# Patient Record
Sex: FEMALE | Race: WHITE | URBAN - METROPOLITAN AREA
[De-identification: names, ages, dates, MRNs, and addresses within clinical notes are randomized per-mention and may not be internally consistent; named-entity substitution may affect disease eponyms.]

---

## 2017-04-10 ENCOUNTER — TRANSFERRED RECORDS (OUTPATIENT)
Dept: HEALTH INFORMATION MANAGEMENT | Facility: CLINIC | Age: 22
End: 2017-04-10

## 2017-04-18 ENCOUNTER — TELEPHONE (OUTPATIENT)
Dept: OTHER | Facility: CLINIC | Age: 22
End: 2017-04-18

## 2017-04-18 NOTE — TELEPHONE ENCOUNTER
Referral received via fax.     Pt referred to [facility] by Hugo Chin MD from [facility] for possible popliteal entrapment.    Pt needs to be scheduled for consult with vascular surgery.  Will route to scheduling to coordinate an appointment at next available.    Mabel Soto RN BSN

## 2017-05-11 ENCOUNTER — OFFICE VISIT (OUTPATIENT)
Dept: OTHER | Facility: CLINIC | Age: 22
End: 2017-05-11
Attending: SURGERY
Payer: OTHER GOVERNMENT

## 2017-05-11 VITALS
HEIGHT: 64 IN | DIASTOLIC BLOOD PRESSURE: 67 MMHG | SYSTOLIC BLOOD PRESSURE: 121 MMHG | WEIGHT: 146 LBS | HEART RATE: 77 BPM | BODY MASS INDEX: 24.92 KG/M2

## 2017-05-11 DIAGNOSIS — I77.89 POPLITEAL ARTERY ENTRAPMENT SYNDROME (H): Primary | ICD-10-CM

## 2017-05-11 PROCEDURE — 99211 OFF/OP EST MAY X REQ PHY/QHP: CPT

## 2017-05-11 PROCEDURE — 99203 OFFICE O/P NEW LOW 30 MIN: CPT | Mod: ZP | Performed by: SURGERY

## 2017-05-11 NOTE — MR AVS SNAPSHOT
"              After Visit Summary   2017    Altagracia Wright    MRN: [id number]           Patient Information     Date Of Birth          1995        Visit Information        Provider Department      2017 2:00 PM Herbierto Osborn MD [facility] at  [facility] [place]      Today's Diagnoses     Popliteal artery entrapment syndrome (H)    -  1       Follow-ups after your visit        Who to contact     If you have questions or need follow up information about today's clinic visit or your schedule please contact [facility] directly at [phone].  Normal or non-critical lab and imaging results will be communicated to you by [employer], letter or phone within 4 business days after the clinic has received the results. If you do not hear from us within 7 days, please contact the clinic through [employer] or phone. If you have a critical or abnormal lab result, we will notify you by phone as soon as possible.  Submit refill requests through [employer] or call your pharmacy and they will forward the refill request to us. Please allow 3 business days for your refill to be completed.          Additional Information About Your Visit        MyChart Information     [employer] lets you send messages to your doctor, view your test results, renew your prescriptions, schedule appointments and more. To sign up, go to [email or web address] . Click on \"Log in\" on the left side of the screen, which will take you to the Welcome page. Then click on \"Sign up Now\" on the right side of the page.     You will be asked to enter the access code listed below, as well as some personal information. Please follow the directions to create your username and password.     Your access code is: [email or web address]  Expires: 2017  7:01 PM     Your access code will  in 90 days. If you need help or a new code, please call your [facility] clinic or [phone].        Care " "EveryWhere ID     This is your Care EveryWhere ID. This could be used by other organizations to access your [facility] medical records  [id number]        Your Vitals Were     Pulse Height BMI (Body Mass Index)             77 5' 4\" (1.626 m) 25.06 kg/m2          Blood Pressure from Last 3 Encounters:   05/11/17 121/67    Weight from Last 3 Encounters:   05/11/17 146 lb (66.2 kg)              Today, you had the following     No orders found for display       Primary Care Provider Office Phone # Fax #    Heriberto Zavala [phone]       [facility] [address]  [facility] [address]        Thank you!     Thank you for choosing [facility] [place]  for your care. Our goal is always to provide you with excellent care. Hearing back from our patients is one way we can continue to improve our services. Please take a few minutes to complete the written survey that you may receive in the mail after your visit with us. Thank you!             Your Updated Medication List - Protect others around you: Learn how to safely use, store and throw away your medicines at www.disposemymeds.org.          This list is accurate as of: 5/11/17  7:01 PM.  Always use your most recent med list.                   Brand Name Dispense Instructions for use    IBUPROFEN PO          NAPROXEN PO            "

## 2017-05-11 NOTE — NURSING NOTE
"Chief Complaint   Patient presents with     Consult     popliteal entrapment referred by Dr. Chin at [facility]       Initial /67 (BP Location: Right arm, Patient Position: Chair, Cuff Size: Adult Regular)  Pulse 77  Ht 5' 4\" (1.626 m)  Wt 146 lb (66.2 kg)  BMI 25.06 kg/m2 Estimated body mass index is 25.06 kg/(m^2) as calculated from the following:    Height as of this encounter: 5' 4\" (1.626 m).    Weight as of this encounter: 146 lb (66.2 kg).  Medication Reconciliation: complete    Face to face time: 5 minutes    Jeannie Naranjo CMA    "

## 2017-05-11 NOTE — PROGRESS NOTES
Altagracia Wright The mother came to see me today per reference of  from [facility].  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  This began when she was training for a marathon.  Initially the pain was a muscle tightness there was exacerbated with running.  When she avoided running and activities her symptoms did improve.  If she is very active during the day discomfort can persist in her calves for hours into the evening.  She does have some tingling sensation but is unsure whether this is on the  Lateral or medial calf and foot.      She is on a [employer] scholarship and plans to enter the  upon completion of college.  However with a running necessary for her  training her legs are becoming such a problem she is unable to do this  Which may affect her career plans.  She's had no trauma associated with this and no history of shin splints or other pathology.    Her symptoms have progressively increased despite avoiding exercise for periods of time.  In retrospect she noticed similar problems while in high school but not as severe.  She's be even noticing now that she bends over at her waist to touch her toes she'll get these symptoms right away in both along with some mild tingling ( does not even necessary to run to cause symptoms).        PMH:  No allergies.             Medications: Allegra, meloxicam, Tylenol, NSAIDs prn             No surgeries.              Mild allergies.               Never smoked.  Never pregnant.  College student.      Activities include running, aerobics, bicycling, swimming.    Exam: Very healthy talkative Young woman.              Blood pressure 121/67 right and 110/67 left.  Pulse 81 and regular              Chest= clear   Cardiovascular=RR               Well-developed extremities bilaterally.  No edema.  No varicosities.                 Normal sensation.                +3 palpable dorsalis pedis and posterior tibial pulses bilaterally                No decrease in pulses with forced plantar flexion.        Impression: Bilateral calf discomfort usually associated with exercise but now even with bending with her knees hyper extended.  This certainly does raise the possibility of about popliteal compression syndrome.  We discussed the various  Types of compression they can occur from the medial head of the gastrocnemius muscle, plantaris muscle/tendon, and Soleus fashion.However, one needs to localize improve there is some compression before any type of recommended treatment can be performed to help her return to normal activities particularly running.  She and her mother aware of the her symptoms and the possibility of entrapment usually will not lead to any damage to the neurovascular structures.  However, due to her life goals not running and doing other activities is not acceptable alternative to her.                       The new symptom of the pain occurring almost immediately after bending down to touch her toes with her knees hyperextended raise the possibility of compression from the plantaris muscle/tendon.       With us will evaluate this further with noninvasive testing and make further decisions on treatment recommendations pending these findings.  I spent over 40 minutes with the patient and her mother today with over 50% in counseling.       Heriberto Osborn MD   Please route or send letter to:  Dr Hugo NGO.

## 2017-05-11 NOTE — LETTER
[facility] [address]  [place] [address]  Phone: [phone]  Fax: [phone]      May 11, 2017    RE:  Altagracia Wright-:  [birth year]    Altagracia Wright and her mother came to see me today per reference of  from [facility]. 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 This began when she was training for a marathon. Initially the pain was a muscle tightness there was exacerbated with running. When she avoided running and activities her symptoms did improve. If she is very active during the day discomfort can persist in her calves for hours into the evening. She does have some tingling sensation but is unsure whether this is on the Lateral or medial calf and foot.     She is on a [employer] scholarship and plans to enter the  upon completion of college. However with a running necessary for her  training her legs are becoming such a problem she is unable to do this Which may affect her career plans. She's had no trauma associated with this and no history of shin splints or other pathology.     Her symptoms have progressively increased despite avoiding exercise for periods of time. In retrospect she noticed similar problems while in high school but not as severe. She's be even noticing now that she bends over at her waist to touch her toes she'll get these symptoms right away in both along with some mild tingling ( does not even necessary to run to cause symptoms).       PMH: No allergies.  Medications: Allegra, meloxicam, Tylenol, NSAIDs prn  No surgeries.  Mild allergies.  Never smoked. Never pregnant. College student.     Activities include running, aerobics, bicycling, swimming.     Exam: Very healthy talkative Young woman.  Blood pressure 121/67 right and 110/67 left. Pulse 81 and regular  Chest= clear  Cardiovascular=RR  Well-developed extremities bilaterally. No edema. No varicosities.  Normal sensation.  +3 palpable dorsalis pedis and posterior tibial pulses bilaterally  No decrease in pulses with forced plantar flexion.      Impression: Bilateral calf discomfort usually associated with exercise but now even with bending with her knees hyper extended. This certainly does raise the possibility of about popliteal compression syndrome. We discussed the various Types of compression they can occur from the medial head of the gastrocnemius muscle, plantaris muscle/tendon, and Soleus fashion.However, one needs to localize improve there is some compression before any type of recommended treatment can be performed to help her return to normal activities particularly running. She and her mother aware of the her symptoms and the possibility of entrapment usually will not lead to any damage to the neurovascular structures. However, due to her life goals not running and doing other activities is not acceptable alternative to her.     The new symptom of the pain occurring almost immediately after bending down to touch her toes with her knees hyperextended raise the possibility of compression from the plantaris muscle/tendon.     With us will evaluate this further with noninvasive testing and make further decisions on treatment recommendations pending these findings. I spent over 40 minutes with the patient and her mother today with over 50% in counseling.        Heriberto Obsorn MD

## 2017-05-12 DIAGNOSIS — I77.89 POPLITEAL ARTERY ENTRAPMENT SYNDROME (H): Primary | ICD-10-CM

## 2017-05-15 ENCOUNTER — HOSPITAL ENCOUNTER (OUTPATIENT)
Dept: ULTRASOUND IMAGING | Facility: CLINIC | Age: 22
End: 2017-05-15
Attending: SURGERY
Payer: OTHER GOVERNMENT

## 2017-05-15 ENCOUNTER — HOSPITAL ENCOUNTER (OUTPATIENT)
Dept: ULTRASOUND IMAGING | Facility: CLINIC | Age: 22
Discharge: HOME | End: 2017-05-15
Attending: SURGERY | Admitting: SURGERY
Payer: OTHER GOVERNMENT

## 2017-05-15 DIAGNOSIS — I77.89 POPLITEAL ARTERY ENTRAPMENT SYNDROME (H): ICD-10-CM

## 2017-05-15 PROCEDURE — 93925 LOWER EXTREMITY STUDY: CPT

## 2017-05-15 PROCEDURE — 93970 EXTREMITY STUDY: CPT

## 2017-05-15 PROCEDURE — 93924 LWR XTR VASC STDY BILAT: CPT

## 2017-05-16 ENCOUNTER — TELEPHONE (OUTPATIENT)
Dept: OTHER | Facility: CLINIC | Age: 22
End: 2017-05-16

## 2017-05-16 NOTE — TELEPHONE ENCOUNTER
I spoke with Altagracia Wright on the phone today.  She was seen at the vascular office last week for bilateral calf discomfort and discoloration with exercise and position.  Her history and findings were somewhat equivocal for popliteal artery entrapment syndrome.  However, her symptoms are worsening.  She is in [employer] and plans a  career.  To do so she has to be able to run and she is unable to do so due to her leg problems.    She underwent noninvasive testing yesterday.  Her ABIs with exercise are essentially normal but was a slight decrease on the left side from 1.05 at rest to 0.96 with exercise.    There was no evidence of compression of the polyp which artery bilaterally at any level at rest or with exercise.    There was some changes of the mid popliteal vein bilaterally somewhat more prominent on the left and right side at rest and also with maneuvers.  There is also a slight decrease with maneuvers of the above-knee popliteal vein with maneuvers.  There was no decrease at all of the below-knee popliteal venous segment with maneuvers.  This actually increased in diameter with maneuvers implying that perhaps there is a more proximal obstruction.    Unfortunately this test is nondiagnostic.  There is some suspicion that the plantaris muscle/tendon could be causing compression of the popliteal veins at the mid level which could explain swelling and pain in her anterior compartment and posterior compartment.  Typically we would desire a more diagnostic test with marked or near total compression of the popliteal vein with maneuvers but this is not always the situation.    She has failed all conservative treatment and is quite disabled.  Before proceeding with anterior compartment fasciotomies for exercise-induced compartment syndrome I would have her consider bilateral isolated division of the plantaris muscle and tendon to see if this will relieve the venous obstruction and relieve her symptoms.  I would  not recommend dividing the soleus muscle which has more potential complications of scar tissue developing..  Typically the surgery as a very rapid recovery with return to normal activities--I discussed this with her and her mother at the office visit.    Altagracia is well aware of my concerns of whether this will relieve her symptoms from our discussion on the phone today.  She'll discuss this with her mother and contact me with their decision.    We spent 15 minutes on the phone today discussing the situation and the controversy.      Heriberto Osborn MD

## 2017-05-30 ENCOUNTER — TELEPHONE (OUTPATIENT)
Dept: OTHER | Facility: CLINIC | Age: 22
End: 2017-05-30

## 2017-05-31 ENCOUNTER — APPOINTMENT (OUTPATIENT)
Dept: SURGERY | Facility: PHYSICIAN GROUP | Age: 22
End: 2017-05-31
Payer: OTHER GOVERNMENT

## 2017-05-31 ENCOUNTER — HOSPITAL ENCOUNTER (OUTPATIENT)
Facility: CLINIC | Age: 22
Discharge: HOME | End: 2017-05-31
Attending: SURGERY | Admitting: SURGERY
Payer: OTHER GOVERNMENT

## 2017-05-31 ENCOUNTER — ANESTHESIA EVENT (OUTPATIENT)
Dept: SURGERY | Facility: CLINIC | Age: 22
End: 2017-05-31
Payer: OTHER GOVERNMENT

## 2017-05-31 ENCOUNTER — SURGERY (OUTPATIENT)
Age: 22
End: 2017-05-31

## 2017-05-31 ENCOUNTER — ANESTHESIA (OUTPATIENT)
Dept: SURGERY | Facility: CLINIC | Age: 22
End: 2017-05-31
Payer: OTHER GOVERNMENT

## 2017-05-31 VITALS
OXYGEN SATURATION: 97 % | RESPIRATION RATE: 16 BRPM | TEMPERATURE: 98.1 F | SYSTOLIC BLOOD PRESSURE: 111 MMHG | HEIGHT: 64 IN | WEIGHT: 146.9 LBS | BODY MASS INDEX: 25.08 KG/M2 | DIASTOLIC BLOOD PRESSURE: 68 MMHG

## 2017-05-31 DIAGNOSIS — I77.89 POPLITEAL ARTERY ENTRAPMENT SYNDROME (H): Primary | ICD-10-CM

## 2017-05-31 LAB
ANION GAP SERPL CALCULATED.3IONS-SCNC: 7 MMOL/L (ref 3–14)
BUN SERPL-MCNC: 13 MG/DL (ref 7–30)
CALCIUM SERPL-MCNC: 8.6 MG/DL (ref 8.5–10.1)
CHLORIDE SERPL-SCNC: 105 MMOL/L (ref 94–109)
CO2 SERPL-SCNC: 26 MMOL/L (ref 20–32)
CREAT SERPL-MCNC: 0.77 MG/DL (ref 0.52–1.04)
GFR SERPL CREATININE-BSD FRML MDRD: NORMAL ML/MIN/1.7M2
GLUCOSE SERPL-MCNC: 89 MG/DL (ref 70–99)
HCG SERPL QL: NEGATIVE
POTASSIUM SERPL-SCNC: 4.1 MMOL/L (ref 3.4–5.3)
SODIUM SERPL-SCNC: 138 MMOL/L (ref 133–144)

## 2017-05-31 PROCEDURE — 25000125 ZZHC RX 250: Performed by: SURGERY

## 2017-05-31 PROCEDURE — 27210995 ZZH RX 272: Performed by: SURGERY

## 2017-05-31 PROCEDURE — 71000012 ZZH RECOVERY PHASE 1 LEVEL 1 FIRST HR: Performed by: SURGERY

## 2017-05-31 PROCEDURE — S0020 INJECTION, BUPIVICAINE HYDRO: HCPCS | Performed by: SURGERY

## 2017-05-31 PROCEDURE — 27602 DECOMPRESSION OF LOWER LEG: CPT | Mod: 50 | Performed by: SURGERY

## 2017-05-31 PROCEDURE — 36000093 ZZH SURGERY LEVEL 4 1ST 30 MIN: Performed by: SURGERY

## 2017-05-31 PROCEDURE — 25000132 ZZH RX MED GY IP 250 OP 250 PS 637: Performed by: SURGERY

## 2017-05-31 PROCEDURE — 27210794 ZZH OR GENERAL SUPPLY STERILE: Performed by: SURGERY

## 2017-05-31 PROCEDURE — 40000170 ZZH STATISTIC PRE-PROCEDURE ASSESSMENT II: Performed by: SURGERY

## 2017-05-31 PROCEDURE — 25000128 H RX IP 250 OP 636: Performed by: SURGERY

## 2017-05-31 PROCEDURE — 71000027 ZZH RECOVERY PHASE 2 EACH 15 MINS: Performed by: SURGERY

## 2017-05-31 PROCEDURE — 36415 COLL VENOUS BLD VENIPUNCTURE: CPT | Performed by: SURGERY

## 2017-05-31 PROCEDURE — 37000008 ZZH ANESTHESIA TECHNICAL FEE, 1ST 30 MIN: Performed by: SURGERY

## 2017-05-31 PROCEDURE — 25000125 ZZHC RX 250: Performed by: ANESTHESIOLOGY

## 2017-05-31 PROCEDURE — 36000063 ZZH SURGERY LEVEL 4 EA 15 ADDTL MIN: Performed by: SURGERY

## 2017-05-31 PROCEDURE — 80048 BASIC METABOLIC PNL TOTAL CA: CPT | Performed by: SURGERY

## 2017-05-31 PROCEDURE — 71000013 ZZH RECOVERY PHASE 1 LEVEL 1 EA ADDTL HR: Performed by: SURGERY

## 2017-05-31 PROCEDURE — 37000009 ZZH ANESTHESIA TECHNICAL FEE, EACH ADDTL 15 MIN: Performed by: SURGERY

## 2017-05-31 PROCEDURE — 25000128 H RX IP 250 OP 636: Performed by: NURSE ANESTHETIST, CERTIFIED REGISTERED

## 2017-05-31 PROCEDURE — 25000125 ZZHC RX 250: Performed by: NURSE ANESTHETIST, CERTIFIED REGISTERED

## 2017-05-31 PROCEDURE — 84703 CHORIONIC GONADOTROPIN ASSAY: CPT | Performed by: SURGERY

## 2017-05-31 RX ORDER — MAGNESIUM HYDROXIDE 1200 MG/15ML
LIQUID ORAL PRN
Status: DISCONTINUED | OUTPATIENT
Start: 2017-05-31 | End: 2017-05-31 | Stop reason: HOSPADM

## 2017-05-31 RX ORDER — FENTANYL CITRATE 50 UG/ML
25-50 INJECTION, SOLUTION INTRAMUSCULAR; INTRAVENOUS
Status: DISCONTINUED | OUTPATIENT
Start: 2017-05-31 | End: 2017-05-31 | Stop reason: HOSPADM

## 2017-05-31 RX ORDER — ONDANSETRON 2 MG/ML
4 INJECTION INTRAMUSCULAR; INTRAVENOUS EVERY 30 MIN PRN
Status: DISCONTINUED | OUTPATIENT
Start: 2017-05-31 | End: 2017-05-31 | Stop reason: HOSPADM

## 2017-05-31 RX ORDER — SODIUM CHLORIDE, SODIUM LACTATE, POTASSIUM CHLORIDE, CALCIUM CHLORIDE 600; 310; 30; 20 MG/100ML; MG/100ML; MG/100ML; MG/100ML
INJECTION, SOLUTION INTRAVENOUS CONTINUOUS PRN
Status: DISCONTINUED | OUTPATIENT
Start: 2017-05-31 | End: 2017-05-31

## 2017-05-31 RX ORDER — LIDOCAINE HYDROCHLORIDE 20 MG/ML
INJECTION, SOLUTION INFILTRATION; PERINEURAL PRN
Status: DISCONTINUED | OUTPATIENT
Start: 2017-05-31 | End: 2017-05-31

## 2017-05-31 RX ORDER — EPHEDRINE SULFATE 50 MG/ML
INJECTION, SOLUTION INTRAMUSCULAR; INTRAVENOUS; SUBCUTANEOUS PRN
Status: DISCONTINUED | OUTPATIENT
Start: 2017-05-31 | End: 2017-05-31

## 2017-05-31 RX ORDER — HYDROMORPHONE HYDROCHLORIDE 1 MG/ML
.3-.5 INJECTION, SOLUTION INTRAMUSCULAR; INTRAVENOUS; SUBCUTANEOUS EVERY 10 MIN PRN
Status: DISCONTINUED | OUTPATIENT
Start: 2017-05-31 | End: 2017-05-31 | Stop reason: HOSPADM

## 2017-05-31 RX ORDER — BUPIVACAINE HYDROCHLORIDE 5 MG/ML
INJECTION, SOLUTION PERINEURAL PRN
Status: DISCONTINUED | OUTPATIENT
Start: 2017-05-31 | End: 2017-05-31 | Stop reason: HOSPADM

## 2017-05-31 RX ORDER — ALBUTEROL SULFATE 0.83 MG/ML
2.5 SOLUTION RESPIRATORY (INHALATION) EVERY 4 HOURS PRN
Status: DISCONTINUED | OUTPATIENT
Start: 2017-05-31 | End: 2017-05-31 | Stop reason: HOSPADM

## 2017-05-31 RX ORDER — OXYCODONE HYDROCHLORIDE 5 MG/1
5-10 TABLET ORAL
Status: COMPLETED | OUTPATIENT
Start: 2017-05-31 | End: 2017-05-31

## 2017-05-31 RX ORDER — MEPERIDINE HYDROCHLORIDE 25 MG/ML
12.5 INJECTION INTRAMUSCULAR; INTRAVENOUS; SUBCUTANEOUS
Status: DISCONTINUED | OUTPATIENT
Start: 2017-05-31 | End: 2017-05-31 | Stop reason: HOSPADM

## 2017-05-31 RX ORDER — ACETAMINOPHEN 325 MG/1
650 TABLET ORAL
Status: DISCONTINUED | OUTPATIENT
Start: 2017-05-31 | End: 2017-05-31 | Stop reason: HOSPADM

## 2017-05-31 RX ORDER — NALOXONE HYDROCHLORIDE 0.4 MG/ML
.1-.4 INJECTION, SOLUTION INTRAMUSCULAR; INTRAVENOUS; SUBCUTANEOUS
Status: DISCONTINUED | OUTPATIENT
Start: 2017-05-31 | End: 2017-05-31 | Stop reason: HOSPADM

## 2017-05-31 RX ORDER — DEXAMETHASONE SODIUM PHOSPHATE 4 MG/ML
INJECTION, SOLUTION INTRA-ARTICULAR; INTRALESIONAL; INTRAMUSCULAR; INTRAVENOUS; SOFT TISSUE PRN
Status: DISCONTINUED | OUTPATIENT
Start: 2017-05-31 | End: 2017-05-31

## 2017-05-31 RX ORDER — CEFAZOLIN SODIUM 2 G/100ML
2 INJECTION, SOLUTION INTRAVENOUS
Status: COMPLETED | OUTPATIENT
Start: 2017-05-31 | End: 2017-05-31

## 2017-05-31 RX ORDER — FENTANYL CITRATE 50 UG/ML
INJECTION, SOLUTION INTRAMUSCULAR; INTRAVENOUS PRN
Status: DISCONTINUED | OUTPATIENT
Start: 2017-05-31 | End: 2017-05-31

## 2017-05-31 RX ORDER — ONDANSETRON 4 MG/1
4 TABLET, ORALLY DISINTEGRATING ORAL EVERY 30 MIN PRN
Status: DISCONTINUED | OUTPATIENT
Start: 2017-05-31 | End: 2017-05-31 | Stop reason: HOSPADM

## 2017-05-31 RX ORDER — KETOROLAC TROMETHAMINE 30 MG/ML
INJECTION, SOLUTION INTRAMUSCULAR; INTRAVENOUS PRN
Status: DISCONTINUED | OUTPATIENT
Start: 2017-05-31 | End: 2017-05-31

## 2017-05-31 RX ORDER — ONDANSETRON 2 MG/ML
INJECTION INTRAMUSCULAR; INTRAVENOUS PRN
Status: DISCONTINUED | OUTPATIENT
Start: 2017-05-31 | End: 2017-05-31

## 2017-05-31 RX ORDER — PROPOFOL 10 MG/ML
INJECTION, EMULSION INTRAVENOUS CONTINUOUS PRN
Status: DISCONTINUED | OUTPATIENT
Start: 2017-05-31 | End: 2017-05-31

## 2017-05-31 RX ORDER — PROPOFOL 10 MG/ML
INJECTION, EMULSION INTRAVENOUS PRN
Status: DISCONTINUED | OUTPATIENT
Start: 2017-05-31 | End: 2017-05-31

## 2017-05-31 RX ORDER — HYDRALAZINE HYDROCHLORIDE 20 MG/ML
2.5-5 INJECTION INTRAMUSCULAR; INTRAVENOUS EVERY 10 MIN PRN
Status: DISCONTINUED | OUTPATIENT
Start: 2017-05-31 | End: 2017-05-31 | Stop reason: HOSPADM

## 2017-05-31 RX ORDER — SODIUM CHLORIDE, SODIUM LACTATE, POTASSIUM CHLORIDE, CALCIUM CHLORIDE 600; 310; 30; 20 MG/100ML; MG/100ML; MG/100ML; MG/100ML
INJECTION, SOLUTION INTRAVENOUS CONTINUOUS
Status: DISCONTINUED | OUTPATIENT
Start: 2017-05-31 | End: 2017-05-31 | Stop reason: HOSPADM

## 2017-05-31 RX ORDER — LABETALOL HYDROCHLORIDE 5 MG/ML
10 INJECTION, SOLUTION INTRAVENOUS
Status: DISCONTINUED | OUTPATIENT
Start: 2017-05-31 | End: 2017-05-31 | Stop reason: HOSPADM

## 2017-05-31 RX ORDER — OXYCODONE HYDROCHLORIDE 5 MG/1
5-10 TABLET ORAL
Qty: 20 TABLET | Refills: 0 | Status: SHIPPED | OUTPATIENT
Start: 2017-05-31

## 2017-05-31 RX ADMIN — MIDAZOLAM HYDROCHLORIDE 2 MG: 1 INJECTION, SOLUTION INTRAMUSCULAR; INTRAVENOUS at 11:21

## 2017-05-31 RX ADMIN — FENTANYL CITRATE 50 MCG: 50 INJECTION, SOLUTION INTRAMUSCULAR; INTRAVENOUS at 13:28

## 2017-05-31 RX ADMIN — KETOROLAC TROMETHAMINE 30 MG: 30 INJECTION, SOLUTION INTRAMUSCULAR at 12:24

## 2017-05-31 RX ADMIN — SODIUM CHLORIDE, POTASSIUM CHLORIDE, SODIUM LACTATE AND CALCIUM CHLORIDE: 600; 310; 30; 20 INJECTION, SOLUTION INTRAVENOUS at 12:26

## 2017-05-31 RX ADMIN — SODIUM CHLORIDE 1000 ML: 0.9 IRRIGANT IRRIGATION at 11:47

## 2017-05-31 RX ADMIN — BUPIVACAINE HYDROCHLORIDE 30 ML: 5 INJECTION, SOLUTION PERINEURAL at 12:31

## 2017-05-31 RX ADMIN — FENTANYL CITRATE 50 MCG: 50 INJECTION, SOLUTION INTRAMUSCULAR; INTRAVENOUS at 12:36

## 2017-05-31 RX ADMIN — SODIUM CHLORIDE, POTASSIUM CHLORIDE, SODIUM LACTATE AND CALCIUM CHLORIDE: 600; 310; 30; 20 INJECTION, SOLUTION INTRAVENOUS at 11:22

## 2017-05-31 RX ADMIN — Medication 5 MG: at 11:58

## 2017-05-31 RX ADMIN — Medication 5 MG: at 11:38

## 2017-05-31 RX ADMIN — FENTANYL CITRATE 50 MCG: 50 INJECTION, SOLUTION INTRAMUSCULAR; INTRAVENOUS at 13:54

## 2017-05-31 RX ADMIN — FENTANYL CITRATE 50 MCG: 50 INJECTION, SOLUTION INTRAMUSCULAR; INTRAVENOUS at 11:46

## 2017-05-31 RX ADMIN — PROPOFOL 110 MG: 10 INJECTION, EMULSION INTRAVENOUS at 11:26

## 2017-05-31 RX ADMIN — LIDOCAINE HYDROCHLORIDE 60 MG: 20 INJECTION, SOLUTION INFILTRATION; PERINEURAL at 11:25

## 2017-05-31 RX ADMIN — PROPOFOL 200 MCG/KG/MIN: 10 INJECTION, EMULSION INTRAVENOUS at 11:28

## 2017-05-31 RX ADMIN — PROPOFOL 50 MG: 10 INJECTION, EMULSION INTRAVENOUS at 11:25

## 2017-05-31 RX ADMIN — DEXAMETHASONE SODIUM PHOSPHATE 4 MG: 4 INJECTION, SOLUTION INTRA-ARTICULAR; INTRALESIONAL; INTRAMUSCULAR; INTRAVENOUS; SOFT TISSUE at 11:39

## 2017-05-31 RX ADMIN — PROPOFOL: 10 INJECTION, EMULSION INTRAVENOUS at 12:13

## 2017-05-31 RX ADMIN — CEFAZOLIN SODIUM 2 G: 2 INJECTION, SOLUTION INTRAVENOUS at 11:33

## 2017-05-31 RX ADMIN — DEXMEDETOMIDINE HYDROCHLORIDE 16 MCG: 100 INJECTION, SOLUTION INTRAVENOUS at 11:25

## 2017-05-31 RX ADMIN — OXYCODONE HYDROCHLORIDE 5 MG: 5 TABLET ORAL at 14:07

## 2017-05-31 RX ADMIN — ONDANSETRON 4 MG: 2 INJECTION INTRAMUSCULAR; INTRAVENOUS at 11:39

## 2017-05-31 RX ADMIN — FENTANYL CITRATE 50 MCG: 50 INJECTION, SOLUTION INTRAMUSCULAR; INTRAVENOUS at 11:25

## 2017-05-31 ASSESSMENT — ENCOUNTER SYMPTOMS
SEIZURES: 0
DYSRHYTHMIAS: 0

## 2017-05-31 ASSESSMENT — LIFESTYLE VARIABLES: TOBACCO_USE: 0

## 2017-05-31 ASSESSMENT — COPD QUESTIONNAIRES: COPD: 0

## 2017-05-31 NOTE — ANESTHESIA CARE TRANSFER NOTE
Patient: Altagracia Wright    Procedure(s):  RIGHT AND LEFT (BILATERAL) DIVISION PLANTARUS MUSCLE TENDON WITH BILATERAL DIVISION OF SOLEUS FASCIAL BAND - Wound Class: I-Clean    Diagnosis: POPLITEAL ARTERY ENTRAPMENT  Diagnosis Additional Information: No value filed.    Anesthesia Type:   General, LMA     Note:  Airway :Face Mask  Patient transferred to:PACU  Comments: Patient to PACU on 10L O2 via FM, breathing spontaneously. Monitors applied, VSS, report to RN. Patient resting comfortably in bed.       Vitals: (Last set prior to Anesthesia Care Transfer)    CRNA VITALS  5/31/2017 1229 - 5/31/2017 1303      5/31/2017             Pulse: 59    SpO2: 100 %                Electronically Signed By: DEB Floyd CRNA  May 31, 2017  1:03 PM

## 2017-05-31 NOTE — IP AVS SNAPSHOT
MRN:[id number]                      After Visit Summary   5/31/2017    Altagracia Wright    MRN: [id number]           Thank you!     Thank you for choosing [facility] for your care. Our goal is always to provide you with excellent care. Hearing back from our patients is one way we can continue to improve our services. Please take a few minutes to complete the written survey that you may receive in the mail after you visit with us. Thank you!        Patient Information     Date Of Birth          1995        Designated Caregiver       Most Recent Value    Caregiver    Will someone help with your care after discharge? yes    Name of designated caregiver ELKIN-mother      About your hospital stay     You were admitted on:  May 31, 2017 You last received care in the:  [facility] PreOP/Phase II    You were discharged on:  May 31, 2017       Who to Call     For medical emergencies, please call 911.  For non-urgent questions about your medical care, please call your primary care provider or clinic, [phone]  For questions related to your surgery, please call your surgery clinic        Attending Provider     Provider Specialty    Heribreto Osborn MD Surgery       Primary Care Provider Office Phone # Fax #    Heriberto Zavala [phone]      After Care Instructions     Diet Instructions       Resume pre-procedure diet            Discharge Instructions       Patient to follow up with appointment in 2 weeks with Dr. Osborn in clinic.            Dressing       Keep dressing clean and dry.  Remove on Friday. You have white steri strips on the incision, leave these alone and they will fall off in 7-10 days.            Ice to affected area       Ice to operative site PRN            Shower       OK to shower on post operative day #2 (Friday). Do not soak incisions under water in bath tub/swimming for two weeks.            Weight bearing status - As tolerated       No  activity restrictions                  Further instructions from your care team       Same Day Surgery Discharge Instructions for  Sedation and General Anesthesia       It's not unusual to feel dizzy, light-headed or faint for up to 24 hours after surgery or while taking pain medication.  If you have these symptoms: sit for a few minutes before standing and have someone assist you when you get up to walk or use the bathroom.      You should rest and relax for the next 24 hours. We recommend you make arrangements to have an adult stay with you for at least 24 hours after your discharge.  Avoid hazardous and strenuous activity.      DO NOT DRIVE any vehicle or operate mechanical equipment for 24 hours following the end of your surgery.  Even though you may feel normal, your reactions may be affected by the medication you have received.      Do not drink alcoholic beverages for 24 hours following surgery.       Slowly progress to your regular diet as you feel able. It's not unusual to feel nauseated and/or vomit after receiving anesthesia.  If you develop these symptoms, drink clear liquids (apple juice, ginger ale, broth, 7-up, etc. ) until you feel better.  If your nausea and vomiting persists for 24 hours, please notify your surgeon.        All narcotic pain medications, along with inactivity and anesthesia, can cause constipation. Drinking plenty of liquids and increasing fiber intake will help.      For any questions of a medical nature, call your surgeon.      Do not make important decisions for 24 hours.      If you had general anesthesia, you may have a sore throat for a couple of days related to the breathing tube used during surgery.  You may use Cepacol lozenges to help with this discomfort.  If it worsens or if you develop a fever, contact your surgeon.       If you feel your pain is not well managed with the pain medications prescribed by your surgeon, please contact your surgeon's office to let them know  "so they can address your concerns.       Reasons to contact your surgeon:    1. Signs of possible infection: After you remove the dressings, check your incision daily for redness, swelling, warmth, red streaks or foul drainage.   2. Elevated temperature.  3. Pain not controlled with pain medication and/or rest.   4. Uncontrolled nausea or vomiting.  5. Any questions or concerns.          While you were at the hospital today you received Toradol, an antiinflammatory medication similar to Ibuprofen.  You should not take other antiinflammatory medication, such as Ibuprofen, Motrin, Advil, Aleve, Naprosyn, etc, until 6:30pm.     **If you have questions or concerns about your procedure  call Dr Heriberto Osborn at [phone]**        Pending Results     No orders found from 2017 to 2017.            Admission Information     Date & Time Provider Department Dept. Phone    2017 Heriberto Osborn MD [facility] PreOP/Phase -[phone]      Your Vitals Were     Blood Pressure Temperature Respirations Height Weight Last Period    118/59 98.1  F (36.7  C) 9 1.626 m (5' 4\") 66.6 kg (146 lb 14.4 oz) 2017    Pulse Oximetry BMI (Body Mass Index)                98% 25.22 kg/m2          MyChart Information     [employer] lets you send messages to your doctor, view your test results, renew your prescriptions, schedule appointments and more. To sign up, go to [email or web address] . Click on \"Log in\" on the left side of the screen, which will take you to the Welcome page. Then click on \"Sign up Now\" on the right side of the page.     You will be asked to enter the access code listed below, as well as some personal information. Please follow the directions to create your username and password.     Your access code is: [email or web address]  Expires: 2017  7:01 PM     Your access code will  in 90 days. If you need help or a new code, please call your [facility] or [phone].        Care " EveryWhere ID     This is your Care EveryWhere ID. This could be used by other organizations to access your [facility] medical records  [id number]           Review of your medicines      START taking        Dose / Directions    oxyCODONE 5 MG IR tablet   Commonly known as:  ROXICODONE   Notes to Patient:  One tablet taken at 2:10 pm.        Dose:  5-10 mg   Take 1-2 tablets (5-10 mg) by mouth every 3 hours as needed for pain or other (Moderate to Severe)   Quantity:  20 tablet   Refills:  0         CONTINUE these medicines which have NOT CHANGED        Dose / Directions    PAMPRIN ALL DAY RELIEF MAX ST PO        Dose:  2 tablet   Take 2 tablets by mouth 2 times daily as needed for moderate pain   Refills:  0            Where to get your medicines      Some of these will need a paper prescription and others can be bought over the counter. Ask your nurse if you have questions.     Bring a paper prescription for each of these medications     oxyCODONE 5 MG IR tablet                Protect others around you: Learn how to safely use, store and throw away your medicines at www.disposemymeds.org.             Medication List: This is a list of all your medications and when to take them. Check marks below indicate your daily home schedule. Keep this list as a reference.      Medications           Morning Afternoon Evening Bedtime As Needed    oxyCODONE 5 MG IR tablet   Commonly known as:  ROXICODONE   Take 1-2 tablets (5-10 mg) by mouth every 3 hours as needed for pain or other (Moderate to Severe)   Last time this was given:  5 mg on 5/31/2017  2:07 PM   Notes to Patient:  One tablet taken at 2:10 pm.                                PAMPRIN ALL DAY RELIEF MAX ST PO   Take 2 tablets by mouth 2 times daily as needed for moderate pain

## 2017-05-31 NOTE — BRIEF OP NOTE
[facility] Brief Operative Note    Pre-operative diagnosis: POPLITEAL ARTERY ENTRAPMENT   Post-operative diagnosis Same   Procedure: Procedure(s):  RIGHT AND LEFT (BILATERAL) DIVISION PLANTARUS MUSCLE TENDON WITH BILATERAL DIVISION OF SOLEUS FASCIAL BAND - Wound Class: I-Clean   Surgeon(s): Surgeon(s) and Role:     * Heriberto Osborn MD - Primary     * Kyung Anaya MD - Resident - Assisting   Estimated blood loss: < 5 cc    Specimens: None   Findings: Plantaris resected, small portion of soleus fascia released

## 2017-05-31 NOTE — ANESTHESIA PREPROCEDURE EVALUATION
Procedure: Procedure(s):  RELEASE POPLITEAL ENTRAPMENT  Preop diagnosis: POPLITEAL ARTERY ENTRAPMENT    No Known Allergies  History reviewed. No pertinent past medical history.  Past Surgical History:   Procedure Laterality Date     EXCISE GANGLION WRIST      2 on the right wrist, 1 on the left wrist     Prior to Admission medications    Medication Sig Start Date End Date Taking? Authorizing Provider   Naproxen Sodium (PAMPRIN ALL DAY RELIEF MAX ST PO) Take 2 tablets by mouth 2 times daily as needed for moderate pain    Yes Reported, Patient     Current Facility-Administered Medications Ordered in Epic   Medication Dose Route Frequency Last Rate Last Dose     ceFAZolin sodium-dextrose (ANCEF) infusion 2 g  2 g Intravenous Pre-Op/Pre-procedure x 1 dose         No current [facility]-ordered outpatient prescriptions on file.     Wt Readings from Last 1 Encounters:   05/31/17 66.6 kg (146 lb 14.4 oz)     Temp Readings from Last 1 Encounters:   05/31/17 36.6  C (97.9  F) (Oral)     BP Readings from Last 6 Encounters:   05/31/17 123/69   05/11/17 121/67     Pulse Readings from Last 4 Encounters:   05/11/17 77     Resp Readings from Last 1 Encounters:   05/31/17 16     SpO2 Readings from Last 1 Encounters:   05/31/17 99%     Recent Labs   Lab Test  05/31/17   0920   NA  138   POTASSIUM  4.1   CHLORIDE  105   CO2  26   ANIONGAP  7   GLC  89   BUN  13   CR  0.77   SUNSHINE  8.6         Anesthesia Evaluation     . Pt has had prior anesthetic. Type: General    No history of anesthetic complications          ROS/MED HX    ENT/Pulmonary:      (-) tobacco use, asthma, COPD and sleep apnea   Neurologic:      (-) seizures, CVA and migraines   Cardiovascular:        (-) hypertension, CAD, PADILLA, arrhythmias, valvular problems/murmurs and dyslipidemia   METS/Exercise Tolerance:  >4 METS   Hematologic:        (-) history of blood clots, anemia and other hematologic disorder   Musculoskeletal:        (-) arthritis   GI/Hepatic:        (-) GERD and  liver disease   Renal/Genitourinary:      (-) renal disease   Endo:      (-) Type I DM, Type II DM and thyroid disease   Psychiatric:        (-) psychiatric history   Infectious Disease:        (-) Recent Fever   Malignancy:         Other:                     Physical Exam  Normal systems: cardiovascular, pulmonary and dental    Airway   Mallampati: I  TM distance: >3 FB  Neck ROM: full    Dental     Cardiovascular   Rhythm and rate: regular and normal  (-) no murmur    Pulmonary    breath sounds clear to auscultation                    Anesthesia Plan      History & Physical Review      ASA Status:  1 .    NPO Status:  > 8 hours    Plan for General and LMA with Propofol induction. Maintenance will be Balanced.    PONV prophylaxis:  Ondansetron (or other 5HT-3) and Dexamethasone or Solumedrol  Propofol infusion.       Postoperative Care  Postoperative pain management:  IV analgesics and Oral pain medications.      Consents  Anesthetic plan, risks, benefits and alternatives discussed with:  Patient..                          .

## 2017-05-31 NOTE — IP AVS SNAPSHOT
[facility] PreOP/Phase II    [address], [address]    [place] [address]    Phone:  [phone]                                       After Visit Summary   5/31/2017    Altagracia Wright    MRN: [id number]           After Visit Summary Signature Page     I have received my discharge instructions, and my questions have been answered. I have discussed any challenges I see with this plan with the nurse or doctor.    ..........................................................................................................................................  Patient/Patient Representative Signature      ..........................................................................................................................................  Patient Representative Print Name and Relationship to Patient    ..................................................               ................................................  Date                                            Time    ..........................................................................................................................................  Reviewed by Signature/Title    ...................................................              ..............................................  Date                                                            Time

## 2017-05-31 NOTE — DISCHARGE INSTRUCTIONS
Same Day Surgery Discharge Instructions for  Sedation and General Anesthesia       It's not unusual to feel dizzy, light-headed or faint for up to 24 hours after surgery or while taking pain medication.  If you have these symptoms: sit for a few minutes before standing and have someone assist you when you get up to walk or use the bathroom.      You should rest and relax for the next 24 hours. We recommend you make arrangements to have an adult stay with you for at least 24 hours after your discharge.  Avoid hazardous and strenuous activity.      DO NOT DRIVE any vehicle or operate mechanical equipment for 24 hours following the end of your surgery.  Even though you may feel normal, your reactions may be affected by the medication you have received.      Do not drink alcoholic beverages for 24 hours following surgery.       Slowly progress to your regular diet as you feel able. It's not unusual to feel nauseated and/or vomit after receiving anesthesia.  If you develop these symptoms, drink clear liquids (apple juice, ginger ale, broth, 7-up, etc. ) until you feel better.  If your nausea and vomiting persists for 24 hours, please notify your surgeon.        All narcotic pain medications, along with inactivity and anesthesia, can cause constipation. Drinking plenty of liquids and increasing fiber intake will help.      For any questions of a medical nature, call your surgeon.      Do not make important decisions for 24 hours.      If you had general anesthesia, you may have a sore throat for a couple of days related to the breathing tube used during surgery.  You may use Cepacol lozenges to help with this discomfort.  If it worsens or if you develop a fever, contact your surgeon.       If you feel your pain is not well managed with the pain medications prescribed by your surgeon, please contact your surgeon's office to let them know so they can address your concerns.       Reasons to contact your surgeon:    1. Signs  of possible infection: After you remove the dressings, check your incision daily for redness, swelling, warmth, red streaks or foul drainage.   2. Elevated temperature.  3. Pain not controlled with pain medication and/or rest.   4. Uncontrolled nausea or vomiting.  5. Any questions or concerns.          While you were at the hospital today you received Toradol, an antiinflammatory medication similar to Ibuprofen.  You should not take other antiinflammatory medication, such as Ibuprofen, Motrin, Advil, Aleve, Naprosyn, etc, until 6:30pm.     **If you have questions or concerns about your procedure  call Dr Heriberto sOborn at [phone]**

## 2017-05-31 NOTE — PROGRESS NOTES
Admission medication history interview status for the 5/31/2017  admission is complete. See EPIC admission navigator for prior to admission medications     Medication history source reliability:Good    Medication history interview source(s):Patient    Medication history resources (including written lists, pill bottles, clinic record):None    Primary pharmacy.[facility]    Additional medication history information not noted on PTA med list :None    Time spent in this activity: 40 minutes    Prior to Admission medications    Medication Sig Last Dose Taking? Auth Provider   Naproxen Sodium (PAMPRIN ALL DAY RELIEF MAX ST PO) Take 2 tablets by mouth 2 times daily as needed for moderate pain  5/25/2017 at PRN Yes Reported, Patient

## 2017-05-31 NOTE — ANESTHESIA POSTPROCEDURE EVALUATION
Patient: Altagracia Wright    Procedure(s):  RIGHT AND LEFT (BILATERAL) DIVISION PLANTARUS MUSCLE TENDON WITH BILATERAL DIVISION OF SOLEUS FASCIAL BAND - Wound Class: I-Clean    Diagnosis:POPLITEAL ARTERY ENTRAPMENT  Diagnosis Additional Information: No value filed.    Anesthesia Type:  General, LMA    Note:  Anesthesia Post Evaluation    Patient location during evaluation: PACU  Patient participation: Able to fully participate in evaluation  Level of consciousness: awake and alert  Pain management: adequate  Airway patency: patent  Cardiovascular status: acceptable  Respiratory status: acceptable  Hydration status: acceptable  PONV: none     Anesthetic complications: None          Last vitals:  Vitals:    05/31/17 1330 05/31/17 1345 05/31/17 1400   BP: 114/59 120/68 116/70   Resp: 16 10 16   Temp: 36.3  C (97.3  F) 36.4  C (97.5  F) 36.6  C (97.9  F)   SpO2: 100% 100% 97%         Electronically Signed By: Gilda Tavarez MD  May 31, 2017  2:12 PM

## 2017-06-01 ENCOUNTER — TELEPHONE (OUTPATIENT)
Dept: OTHER | Facility: CLINIC | Age: 22
End: 2017-06-01

## 2017-06-01 NOTE — TELEPHONE ENCOUNTER
I called Altagracia Wright about her popliteal entrapment surgery yesterday.  She was discharged from recovery.  She has some mild soreness but otherwise is doing well.  She is walking around today and fairly comfortable.  I did for the intraoperative pictures of her excised plantaris muscle to her.  She will see me in follow-up in several weeks.      Heriberto Osborn MD

## 2017-06-01 NOTE — OP NOTE
DATE OF PROCEDURE:  05/31/2017      PREOPERATIVE DIAGNOSIS:  Symptomatic bilateral popliteal artery entrapment syndrome.      POSTOPERATIVE DIAGNOSIS:  Symptomatic bilateral popliteal artery entrapment syndrome.      PROCEDURES:   1.  Division/excision left plantaris muscle and tendon.   a.  Division soleus fascial band.   2.  Division/excision right plantaris muscle and tendon.   a.  Division soleus fascial band.      SURGEON:  Heriberto Osborn MD      :   SANDIE OTERO MD ([facility] surgery resident)      ANESTHESIA:  General LMA.      PREOPERATIVE MEDICATIONS:  Ancef 2 grams IV.      INDICATIONS:  Altagracia Wright is a 22-year-old healthy young woman has noted progressive pain associated with even short distance running.  She also noticed bluish discoloration to her feet when she bends over.  Noninvasive evaluation revealed compression of the popliteal veins at the mid-level.  This was indicative of likely compression by the plantaris muscle and tendon causing functional popliteal artery entrapment syndrome.  There was no definitive compression of the neurovascular structures at the below-knee popliteal area which consistent with the soleus fascial band area.  She has failed all conservative treatment.  We therefore felt that division/excision of both plantaris muscle and tendon was indicated.  We would explore the opening of the deep posterior compartment to see if this also needed to be done.  Risks and benefits have discussed with the patient along with her mother and grandmother.      DESCRIPTION OF PROCEDURE:  The patient was brought induced under general anesthesia.  LMA was placed.  Both legs were prepped and draped in the usual fashion free.  Timeout was called and the sites were identified.        Division/excision left plantaris muscle-endon:  A 6 cm incision was made in the proximal left medial calf.  Dissection was carried down to the fascia.  Two small branches off the greater saphenous vein  were divided between 4-0 silk suture with no injury to the former.  The greater saphenous nerve was easily visualized.  We kept away from this.  The fascia was split vertically avoiding the semitendinosis tendon.  The head of gastrocnemius muscle was retracted posteriorly.  We easily identified the plantaris tendon.  This was clamped and dissected free for a length of approximately 6 cm distally and transected.  With the aid of loupe magnification and light source and appropriate retractors we then freed up the plantaris muscle body and retracting the neurovascular structures until we were well lateral to these.  Clamp was placed on the muscle and this was divided with electrocautery and allowed to retract well lateral to the neurovascular structures with no bleeding.  We evaluated the more proximal neurovascular structures, no evidence of encroachment of the medial head of the gastrocnemius muscle or other anomalous muscle or tendinous structures.      Division of left soleus fascial band:  The soleus fascia going to the posterior compartment was slightly tight.  I could hardly admit a fingertip.  We wanted to keep the morbidity as minimal and since she had no obvious compression of these vessels with maneuvers, we felt that she may benefit with just division of the band itself at the opening of the deep posterior compartment.  We visualized this with protection of the underlying neurovascular structures and divided this for a length of 0.5 cm.  This then allowed a finger to be passed into the deep posterior compartment with no difficulty.  There was no bleeding or manipulation of the neurovascular structures.  The fascia was closed with running 3-0 Vicryl.  Subcutaneous tissue was closed with interrupted 3-0 Vicryl and the skin was closed with 4-0 Monocryl in subcuticular fashion.      Division/excision right plantaris muscle-tendon:  We made a similar incision on the right side.  Again, dissection was carried to  the fascia with no injury to the greater saphenous vein or nerve.  The fascia was split vertically.  Appropriate deep retractors were placed.  We identified the plantaris tendon with no difficulty.  This was dissected free distally for 6 cm and transected.  Again with loupe magnification and light source and appropriate deep retractors we mobilized the muscle on the body of the plantaris until we were well lateral to the neurovascular structures where it was divided with electrocautery and removal of the structure with no bleeding and no encroachment upon the structures.  Again, there was no identify IO band or  muscle compressing the vessels as we looked cephaladly into the medial head of the gastrocnemius muscle.      Division right soleus fascial band:  There was slight tangles going to the deep posterior compartment.  Again, we wanted to minimize the dissection since there was no ultrasound findings of compression.  We visualized the fascial band going to the deep posterior compartment, protecting the neurovascular structures with a right angle and divided this for a length of 0.5 cm.  This then allowed us to easily pass a digit in the deep posterior compartment.  There was excellent hemostasis.  The fascia was closed with running 3-0 Vicryl suture.  Subcutaneous tissue was closed with interrupted 3-0 Vicryl and skin with 4-0 Monocryl in subcuticular fashion.  Wounds were infiltrated with 0.5% Marcaine for postop analgesia along with Toradol 30 mg IV.  Steri-Strips, gauze, Neymar rolls applied leg followed by a thigh-high GILBERTO stocking and pneumatic compression boots.      The patient awoke on the operating table and returned to the recovery room in satisfactory condition.  Needle and sponge count correct x2.      COMPLICATIONS:  None.      ESTIMATED BLOOD LOSS:  Less than 1 mL.         ROBERT MURRAY MD             D: 05/31/2017 12:55   T: 05/31/2017 20:16   MT: [id number]      Name:     ANT COX   MRN:       [id number]        Account:        [id number]   :      1995           Procedure Date: 2017      Document: [id number]       cc: Heriberto Obsorn MD

## 2017-06-15 ENCOUNTER — OFFICE VISIT (OUTPATIENT)
Dept: OTHER | Facility: CLINIC | Age: 22
End: 2017-06-15
Attending: SURGERY
Payer: OTHER GOVERNMENT

## 2017-06-15 VITALS — DIASTOLIC BLOOD PRESSURE: 62 MMHG | HEART RATE: 64 BPM | SYSTOLIC BLOOD PRESSURE: 104 MMHG

## 2017-06-15 DIAGNOSIS — I77.89 POPLITEAL ARTERY ENTRAPMENT SYNDROME (H): Primary | ICD-10-CM

## 2017-06-15 PROCEDURE — 99024 POSTOP FOLLOW-UP VISIT: CPT | Mod: ZP | Performed by: SURGERY

## 2017-06-15 PROCEDURE — 99211 OFF/OP EST MAY X REQ PHY/QHP: CPT

## 2017-06-15 NOTE — PROGRESS NOTES
Altagracia Wright Returns for her first follow-up after undergoing bilateral division of the plantaris muscle/tendon and Dividing the soleus fascial bands bilaterally. (We felt most of her problems were related just to the plantaris muscle/tendon but did notice some tightness intraoperatively of the opening into the deep posterior compartment).    She's done well following the surgery with minimal discomfort.  She stopped the GILBERTO stockings after several days.  She has some mild decreasing numbness along the left calf greater saphenous nerve distribution but none on the right.    She started running the softball bases with marked improvement of her symptoms.  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 She plans and gradually increasing her activities.      She was pushing a car that was stuck in the parking lot at school and noted some swelling on the anterior superficial portion of her right calf incision that is improving.      Exam: Alert and appropriate.  Blood pressure 104/62 pulse 64.             Both proximal medial calf incisions are healing well.  She does have some subcutaneous swelling with no ecchymosis over the right anterior calf incision.  Minimal distal swelling.  Good pulses.  Good range of motion.  Mild numbness on the left greater saphenous nerve distribution but normal on the right.      Impression: Initial very good results following her surgical treatment.  The swelling in the superficial right calf incision were improved with time. She should gradually increase her activities no specific restrictions to running.  We discussed the use of silicone pads to decrease scar formation and she will check into this.  She will see me this fall in follow-up to see if she has an ongoing continued improvement following her PAES surgery.   Heriberto Osborn MD       Please route or send letter to:  *None*

## 2017-06-15 NOTE — NURSING NOTE
"Chief Complaint   Patient presents with     RECHECK     1st po/ RIGHT AND LEFT DIVISION PLANTARIS MUSCLE/TENDON on 5/31/17 with Dr Osborn       Initial /62 (BP Location: Left arm, Patient Position: Chair, Cuff Size: Adult Large)  Pulse 64  LMP 05/22/2017  Breastfeeding? No Estimated body mass index is 25.22 kg/(m^2) as calculated from the following:    Height as of 5/31/17: 5' 4\" (1.626 m).    Weight as of 5/31/17: 146 lb 14.4 oz (66.6 kg).  Medication Reconciliation: complete     Face to face nursing time: 8 minutes    Maria Antonia Hale MA     "

## 2017-06-15 NOTE — MR AVS SNAPSHOT
"              After Visit Summary   6/15/2017    Altagracia Wright    MRN: [id number]           Patient Information     Date Of Birth          1995        Visit Information        Provider Department      6/15/2017 4:00 PM Heriberto Osborn MD [facility] [place]      Today's Diagnoses     Popliteal artery entrapment syndrome (H)    -  1       Follow-ups after your visit        Follow-up notes from your care team     Return in about 3 months (around 9/15/2017).      Who to contact     If you have questions or need follow up information about today's clinic visit or your schedule please contact [facility] directly at [phone].  Normal or non-critical lab and imaging results will be communicated to you by MyChart, letter or phone within 4 business days after the clinic has received the results. If you do not hear from us within 7 days, please contact the clinic through MyChart or phone. If you have a critical or abnormal lab result, we will notify you by phone as soon as possible.  Submit refill requests through [employer] or call your pharmacy and they will forward the refill request to us. Please allow 3 business days for your refill to be completed.          Additional Information About Your Visit        MyChart Information     [employer] lets you send messages to your doctor, view your test results, renew your prescriptions, schedule appointments and more. To sign up, go to [email or web address] . Click on \"Log in\" on the left side of the screen, which will take you to the Welcome page. Then click on \"Sign up Now\" on the right side of the page.     You will be asked to enter the access code listed below, as well as some personal information. Please follow the directions to create your username and password.     Your access code is: [email or web address]  Expires: 2017  7:01 PM     Your access code will  in 90 days. If you " need help or a new code, please call your [place] [facility] clinic or [phone].        Care EveryWhere ID     This is your Care EveryWhere ID. This could be used by other organizations to access your [facility] medical records  [id number]        Your Vitals Were     Pulse Last Period Breastfeeding?             64 05/22/2017 No          Blood Pressure from Last 3 Encounters:   06/15/17 104/62   05/31/17 111/68   05/11/17 121/67    Weight from Last 3 Encounters:   05/31/17 146 lb 14.4 oz (66.6 kg)   05/11/17 146 lb (66.2 kg)              Today, you had the following     No orders found for display       Primary Care Provider Office Phone # Fax #    Heriberto Zavala [phone]       [facility] [address]  [facility] [address]        Thank you!     Thank you for choosing [facility] [place]  for your care. Our goal is always to provide you with excellent care. Hearing back from our patients is one way we can continue to improve our services. Please take a few minutes to complete the written survey that you may receive in the mail after your visit with us. Thank you!             Your Updated Medication List - Protect others around you: Learn how to safely use, store and throw away your medicines at www.disposemymeds.org.          This list is accurate as of: 6/15/17  4:30 PM.  Always use your most recent med list.                   Brand Name Dispense Instructions for use    oxyCODONE 5 MG IR tablet    ROXICODONE    20 tablet    Take 1-2 tablets (5-10 mg) by mouth every 3 hours as needed for pain or other (Moderate to Severe)       PAMPRIN ALL DAY RELIEF MAX ST PO      Take 2 tablets by mouth 2 times daily as needed for moderate pain

## 2017-07-29 ENCOUNTER — HEALTH MAINTENANCE LETTER (OUTPATIENT)
Age: 22
End: 2017-07-29

## 2017-08-10 ENCOUNTER — TELEPHONE (OUTPATIENT)
Dept: OTHER | Facility: CLINIC | Age: 22
End: 2017-08-10

## 2017-08-10 NOTE — TELEPHONE ENCOUNTER
Altagracia called to make a 3 mo f/u appt with Dr Osborn, also wants her last office visit and surgery notes faxed to her [facility] [place] at [phone] (fax).  Done. CSOTT [id number]

## 2017-10-05 ENCOUNTER — OFFICE VISIT (OUTPATIENT)
Dept: OTHER | Facility: CLINIC | Age: 22
End: 2017-10-05
Attending: SURGERY
Payer: OTHER GOVERNMENT

## 2017-10-05 VITALS — HEART RATE: 74 BPM | DIASTOLIC BLOOD PRESSURE: 78 MMHG | SYSTOLIC BLOOD PRESSURE: 120 MMHG

## 2017-10-05 DIAGNOSIS — I77.89 POPLITEAL ARTERY ENTRAPMENT SYNDROME (H): Primary | ICD-10-CM

## 2017-10-05 PROCEDURE — 99211 OFF/OP EST MAY X REQ PHY/QHP: CPT

## 2017-10-05 PROCEDURE — 99213 OFFICE O/P EST LOW 20 MIN: CPT | Mod: ZP | Performed by: SURGERY

## 2017-10-05 NOTE — LETTER
[facility] [address]  [place] [address]  Phone: [phone]  Fax: [phone]        2017    Re: Altagracia Wright - 1995    Altagracia Wright Returns for follow-up of her bilateral division of the plantaris muscle/ttendon and division of the soleus fascia on 2017. Preoperative evaluation did not show extensive compression of the popliteal vessels but we felt that this surgery could be beneficial.     Patient wants to be in the [employer] and has to be able to walk and run.  This was an issue with her preoperatively.  Since her surgery she has noted improvement.  She is walking longer distances and starting to carry heavier packs and doing better.  She does notice some fullness and tightness over her anterior compartments bilaterally but not causing any pain.  Posterior calf remains soft with no discomfort or swelling.     She did have some numbness along the left cutaneous saphenous nerve that is improved but not completely resolved.  She'll occasionally get a shooting pain over the left anterior mid tibial region likely related to this.     PMH: Medications:  P.r.n. ibuprofen     Exam: Alert and appropriate.  Blood pressure 120/78.  Pulse 74.             Both medial calf incisions are healing well.             Good distal pulses.              No swelling.              All muscle compartments are soft today.     Impression:  Patient has improved following her surgery. Hopefully this will continue.  She does notice some fullness of both anterior compartments.  I am not concerned about this unless she should develop pain or numbness.  If this does occur when he would consider fasciotomies such as one would do for exercise-induced compartment syndrome.   If she is asymptomatic no intervention is indicated.     She may resume all of her normal activities including carrying heavier PACs with no restrictions and we've given her a form stating this  today.     No specific follow-up with me is indicated.  However, she will call us in several months to update us on her progress.     Heriberto Osborn MD

## 2017-10-05 NOTE — PROGRESS NOTES
[facility]      Altagracia Wright Returns for follow-up of her bilateral division of the plantaris muscle/ttendon and division of the soleus fascia on 5/31/2017. Preoperative evaluation did not show extensive compression of the popliteal vessels but we felt that this surgery could be beneficial.    Patient wants to be in the [employer] and has to be able to walk and run.  This was an issue with her preoperatively.  Since her surgery she has noted improvement.  She is walking longer distances and starting to carry heavier packs and doing better.  She does notice some fullness and tightness over her anterior compartments bilaterally but not causing any pain.  Posterior calf remains soft with no discomfort or swelling.    She did have some numbness along the left cutaneous saphenous nerve that is improved but not completely resolved.  She'll occasionally get a shooting pain over the left anterior mid tibial region likely related to this.    PMH: Medications:  P.r.n. ibuprofen      Exam: Alert and appropriate.  Blood pressure 120/78.  Pulse 74.             Both medial calf incisions are healing well.             Good distal pulses.              No swelling.              All muscle compartments are soft today.      Impression:  Patient has improved following her surgery. Hopefully this will continue.  She does notice some fullness of both anterior compartments.  I am not concerned about this unless she should develop pain or numbness.  If this does occur when he would consider fasciotomies such as one would do for exercise-induced compartment syndrome.   If she is asymptomatic no intervention is indicated.                        She may resume all of her normal activities including carrying heavier PACs with no restrictions and we've given her a form stating this today.                           No specific follow-up with me is indicated.  However, she will call us in several months to update us on her  progress.       Heriberto Osborn MD     Please route or send letter to:  Primary Care Provider (PCP)

## 2017-10-05 NOTE — NURSING NOTE
"Chief Complaint   Patient presents with     RECHECK     3 mo f/u to 6/15/17 appt w/ dr villarreal, bilateral popliteal entrapment release on 5/31/17       Initial /78 (BP Location: Left arm, Patient Position: Chair, Cuff Size: Adult Regular)  Pulse 74  Breastfeeding? No Estimated body mass index is 25.22 kg/(m^2) as calculated from the following:    Height as of 5/31/17: 5' 4\" (1.626 m).    Weight as of 5/31/17: 146 lb 14.4 oz (66.6 kg).  Medication Reconciliation: complete     Face to face nursing time: 8 minutes    Maria Antonia Hale MA     "

## 2017-10-05 NOTE — MR AVS SNAPSHOT
After Visit Summary   10/5/2017    Altagracia Wright    MRN: [id number]           Patient Information     Date Of Birth          1995        Visit Information        Provider Department      10/5/2017 1:00 PM Heriberto Osborn MD [facility] [place]      Today's Diagnoses     Popliteal artery entrapment syndrome (H)    -  1       Follow-ups after your visit        Follow-up notes from your care team     Return if symptoms worsen or fail to improve.      Who to contact     If you have questions or need follow up information about today's clinic visit or your schedule please contact [facility] directly at [phone].  Normal or non-critical lab and imaging results will be communicated to you by MyChart, letter or phone within 4 business days after the clinic has received the results. If you do not hear from us within 7 days, please contact the clinic through Actimohart or phone. If you have a critical or abnormal lab result, we will notify you by phone as soon as possible.  Submit refill requests through [employer] or call your pharmacy and they will forward the refill request to us. Please allow 3 business days for your refill to be completed.          Additional Information About Your Visit        MyChart Information     ContextPlane gives you secure access to your electronic health record. If you see a primary care provider, you can also send messages to your care team and make appointments. If you have questions, please call your primary care clinic.  If you do not have a primary care provider, please call [phone] and they will assist you.        Care EveryWhere ID     This is your Care EveryWhere ID. This could be used by other organizations to access your [place] medical records  [id number]        Your Vitals Were     Pulse Breastfeeding?                74 No           Blood Pressure from Last 3 Encounters:    10/05/17 120/78   06/15/17 104/62   05/31/17 111/68    Weight from Last 3 Encounters:   05/31/17 146 lb 14.4 oz (66.6 kg)   05/11/17 146 lb (66.2 kg)              Today, you had the following     No orders found for display       Primary Care Provider Office Phone # Fax #    Heriberto Zavala [phone]       [facility] [address] [facility] [address]  [facility] [address]        Equal Access to Services     [facility] : Hadii aad ku hadasho Soomaali, waaxda luqadaha, qaybta kaalmada adeegyada, waxay idiin hayaan adeeg kharajamar irving . So [place] [phone].    ATENCIÓN: Si habla español, tiene a michel disposición servicios gratuitos de asistencia lingüística. [facility] [phone].    We comply with applicable federal civil rights laws and [place] laws. We do not discriminate on the basis of race, color, national origin, age, disability, sex, sexual orientation, or gender identity.            Thank you!     Thank you for choosing [facility] [place]  for your care. Our goal is always to provide you with excellent care. Hearing back from our patients is one way we can continue to improve our services. Please take a few minutes to complete the written survey that you may receive in the mail after your visit with us. Thank you!             Your Updated Medication List - Protect others around you: Learn how to safely use, store and throw away your medicines at www.disposemymeds.org.          This list is accurate as of: 10/5/17  1:42 PM.  Always use your most recent med list.                   Brand Name Dispense Instructions for use Diagnosis    oxyCODONE 5 MG IR tablet    ROXICODONE    20 tablet    Take 1-2 tablets (5-10 mg) by mouth every 3 hours as needed for pain or other (Moderate to Severe)    Popliteal artery entrapment syndrome (H)       PAMPRIN ALL DAY RELIEF MAX ST PO      Take 2 tablets by mouth 2 times daily as needed for moderate pain

## 2017-10-25 ENCOUNTER — THERAPY VISIT (OUTPATIENT)
Dept: PHYSICAL THERAPY | Facility: CLINIC | Age: 22
End: 2017-10-25
Payer: OTHER GOVERNMENT

## 2017-10-25 DIAGNOSIS — M79.A29 NON-TRAUMATIC COMPARTMENT SYNDROME OF LOWER EXTREMITY, UNSPECIFIED LATERALITY: Primary | ICD-10-CM

## 2017-10-25 PROCEDURE — 97161 PT EVAL LOW COMPLEX 20 MIN: CPT | Mod: GP | Performed by: PHYSICAL THERAPIST

## 2017-10-25 PROCEDURE — 97110 THERAPEUTIC EXERCISES: CPT | Mod: GP | Performed by: PHYSICAL THERAPIST

## 2017-10-25 NOTE — MR AVS SNAPSHOT
After Visit Summary   10/25/2017    Altagracia Wright    MRN: [id number]           Patient Information     Date Of Birth          1995        Visit Information        Provider Department      10/25/2017 7:40 AM Timothy Deal PT [facility]        Today's Diagnoses     Non-traumatic compartment syndrome of lower extremity, unspecified laterality    -  1       Follow-ups after your visit        Your next 10 appointments already scheduled     Oct 30, 2017  2:20 PM CDT   (Arrive by 2:05 PM)   MONICA Extremity with Timothy Deal PT   [facility] ([facility])    [facility] [address]  [address] [id number] [phone]              Who to contact     If you have questions or need follow up information about today's clinic visit or your schedule please contact [facility] directly at [phone].  Normal or non-critical lab and imaging results will be communicated to you by MyChart, letter or phone within 4 business days after the clinic has received the results. If you do not hear from us within 7 days, please contact the clinic through [employer] or phone. If you have a critical or abnormal lab result, we will notify you by phone as soon as possible.  Submit refill requests through [employer] or call your pharmacy and they will forward the refill request to us. Please allow 3 business days for your refill to be completed.          Additional Information About Your Visit        MyChart Information     [employer] gives you secure access to your electronic health record. If you see a primary care provider, you can also send messages to your care team and make appointments. If you have questions, please call your primary care clinic.  If you do not have a primary care provider, please call [phone] and they will assist you.        Care EveryWhere ID     This is your Care  EveryWhere ID. This could be used by other organizations to access your [place] medical records  [id number]         Blood Pressure from Last 3 Encounters:   10/05/17 120/78   06/15/17 104/62   05/31/17 111/68    Weight from Last 3 Encounters:   05/31/17 66.6 kg (146 lb 14.4 oz)   05/11/17 66.2 kg (146 lb)              We Performed the Following     HC PT EVAL, LOW COMPLEXITY     THERAPEUTIC EXERCISES        Primary Care Provider Office Phone # Fax #    Heriberto Zavala [phone]       [facility] [address]  [facility] [address]        Equal Access to Services     YURI LANZA : Hadii aad ku hadasho Soomaali, waaxda luqadaha, qaybta kaalmada adeegyada, waxemeterio reddin ashutoshn axel [place]ving . So [facility] [phone].    ATENCIÓN: Si habla español, tiene a michel disposición servicios gratuitos de asistencia lingüística. Llame al [phone].    We comply with applicable federal civil rights laws and [place] laws. We do not discriminate on the basis of race, color, national origin, age, disability, sex, sexual orientation, or gender identity.            Thank you!     [facility] you [facility] [place]  for your care. Our goal is always to provide you with excellent care. Hearing back from our patients is one way we can continue to improve our services. Please take a few minutes to complete the written survey that you may receive in the mail after your visit with us. Thank you!             Your Updated Medication List - Protect others around you: Learn how to safely use, store and throw away your medicines at www.disposemymeds.org.          This list is accurate as of: 10/25/17 11:33 AM.  Always use your most recent med list.                   Brand Name Dispense Instructions for use Diagnosis    oxyCODONE 5 MG IR tablet    ROXICODONE    20 tablet    Take 1-2 tablets (5-10 mg) by mouth every 3 hours as needed for pain or other  (Moderate to Severe)    Popliteal artery entrapment syndrome (H)       PAMPRIN ALL DAY RELIEF MAX ST PO      Take 2 tablets by mouth 2 times daily as needed for moderate pain

## 2017-10-25 NOTE — PROGRESS NOTES
Physical Therapy Initial Examination/Evaluation  October 25, 2017    Altagracia Wright is a 22 year old female referred to physical therapy by Hugo Chin MD for treatment of B LE compartment syndrome with Precautions/Restrictions/MD instructions E&T    Therapist Impression:   Altagracia presents to therapy with B compartment syndrome.  PT examination today was relatively benign except for strength impairments of hip abductors B.  Next session we will perform a running analysis and consider gait retraining techniques.    Subjective:  DOI/onset: April 2016; 10/16/2017 MD apt DOS: none  Acute Injury or Gradual Onset?: Gradual injury over time  Mechanism of Injury: Sport; running, ROTC, lifting, training  Related PMH: medial knee pain; B popliteal muscle removal for popliteal artery entrapment syndrome (May 2017) Previous Treatment: Rest and Ice Effect of prior treatment: fair  Imaging: None  Chief Complaint/Functional Limitations:   Avoiding lower body lifting, pain with running and see below in therapy evaluation codes   Pain: rest 0 /10, activity 2-3/10 Location: anterior LEs Frequency: Intermittent Described as: aching.tightness Alleviated by: Rest and Ice Progression of Symptoms: Unchanged Time of day when pain is worse: Activity related  Sleeping: No issues/uninterrupted   Occupation: student  Job duties: prolonged sitting  Current HEP/exercise regimen: ROTC, lifting, training for marathon  Patient's goals are see chief complaints     Other pertinent PMH/Red Flags: none   Barriers at home/work: None as reported by patient  Pertinent Surgical History: see above, 3 wrist surgeries  Medications: Anti-inflammatory and Pain  General health as reported by patient: good  Return to MD:  Prn    ANKLE EVALUATION    Dynamic Movement Screen  Single leg stance observations: No significant findings for eyes open/closed  Double limb squat observations: Good technique/no significant findings  Single limb squat observations: Knee  valgus  Gait: Not assessed    Ankle Range of Motion  Ankle AROM Dorsiflexion Plantarflexion Inversion Eversion WBing DF (cm)   Left wnl wnl wnl wnl 13   Right wnl wnl wnl wnl 13   **WBing DF- distance between wall and great toe where pt can touch knee to wall and keep heel in contact with floor    Ankle Strength  Ankle MMT Dorsiflexion Plantarflexion Inversion Eversion   Left 5/5 5/5 5/5 5/5   Right 5/5 5/5 5/5 5/5     Provocation tests  Resisted (tedon) Left Right  PF/INV (post tib) Pain-free Pain-free  PF/EV (peroneals) Pain-free Pain-free  DF/INV (ant tib) Pain-free Pain-free  DF/EV (ext. dig) Pain-free Pain free  Overpressure/Stretch (tendon sheath)  Post tib (DV/EV) Pain-free Pain-free  Ant tib (PF/EV) Pain-free Pain-free  Peroneals (DF/INV) Pain-free Pain-free  Ext. Dig (PF/INV) Pain-free Pain free    Special Tests:  Ligament testing: negative subtalar    Palpation  Left: Not assessed  Right: Not assessed    Assessment/Plan:  Patient is a 22 year old female with both sides knee complaints.    Patient has the following significant findings with corresponding treatment plan.                Diagnosis 1:  B compartment syndrome  Pain -  hot/cold therapy, manual therapy, splint/taping/bracing/orthotics, self management, education and home program  Impaired muscle performance - neuro re-education    Therapy Evaluation Codes:   1) History comprised of:   Personal factors that impact the plan of care:      None.    Comorbidity factors that impact the plan of care are:      None.     Medications impacting care: None.  2) Examination of Body Systems comprised of:   Body structures and functions that impact the plan of care:      Lower leg.   Activity limitations that impact the plan of care are:      Running and Sports.  3) Clinical presentation characteristics are:   Stable/Uncomplicated.  4) Decision-Making    Low complexity using standardized patient assessment instrument and/or measureable assessment of functional  outcome.  Cumulative Therapy Evaluation is: Low complexity.    Previous and current functional limitations:  (See Goal Flow Sheet for this information)    Short term and Long term goals: (See Goal Flow Sheet for this information)     Communication ability:  Patient appears to be able to clearly communicate and understand verbal and written communication and follow directions correctly.  Treatment Explanation - The following has been discussed with the patient:   RX ordered/plan of care  Anticipated outcomes  Possible risks and side effects  This patient would benefit from PT intervention to resume normal activities.   Rehab potential is good.    Frequency:  1 X week, once daily  Duration:  for 8 weeks  Discharge Plan:  Achieve all LTG.  Independent in home treatment program.  Reach maximal therapeutic benefit.    Please refer to the daily flowsheet for treatment today, total treatment time and time spent performing 1:1 timed codes.

## 2017-10-30 ENCOUNTER — THERAPY VISIT (OUTPATIENT)
Dept: PHYSICAL THERAPY | Facility: CLINIC | Age: 22
End: 2017-10-30
Payer: OTHER GOVERNMENT

## 2017-10-30 DIAGNOSIS — M79.A29 NON-TRAUMATIC COMPARTMENT SYNDROME OF LOWER EXTREMITY, UNSPECIFIED LATERALITY: ICD-10-CM

## 2017-10-30 PROCEDURE — 97530 THERAPEUTIC ACTIVITIES: CPT | Mod: GP | Performed by: PHYSICAL THERAPIST

## 2017-10-30 PROCEDURE — 97112 NEUROMUSCULAR REEDUCATION: CPT | Mod: GP | Performed by: PHYSICAL THERAPIST

## 2017-10-30 NOTE — MR AVS SNAPSHOT
After Visit Summary   10/30/2017    Altagracia Wright    MRN: [id number]           Patient Information     Date Of Birth          1995        Visit Information        Provider Department      10/30/2017 2:20 PM Timothy Deal PT [facility]        Today's Diagnoses     Non-traumatic compartment syndrome of lower extremity, unspecified laterality           Follow-ups after your visit        Your next 10 appointments already scheduled     Nov 20, 2017  1:40 PM CST   MONICA Extremity with Timothy Deal PT   [facility] ( [facility])    [address] [place] [id number] [phone]              Who to contact     If you have questions or need follow up information about today's clinic visit or your schedule please contact [facility] directly at [phone].  Normal or non-critical lab and imaging results will be communicated to you by SyndicateRoomhart, letter or phone within 4 business days after the clinic has received the results. If you do not hear from us within 7 days, please contact the clinic through [employer] or phone. If you have a critical or abnormal lab result, we will notify you by phone as soon as possible.  Submit refill requests through [employer] or call your pharmacy and they will forward the refill request to us. Please allow 3 business days for your refill to be completed.          Additional Information About Your Visit        MyChart Information     [employer] gives you secure access to your electronic health record. If you see a primary care provider, you can also send messages to your care team and make appointments. If you have questions, please call your primary care clinic.  If you do not have a primary care provider, please call [phone] and they will assist you.        Care EveryWhere ID     This is your Care EveryWhere ID. This could be used  by other organizations to access your [place] medical records  [id number]         Blood Pressure from Last 3 Encounters:   10/05/17 120/78   06/15/17 104/62   05/31/17 111/68    Weight from Last 3 Encounters:   05/31/17 66.6 kg (146 lb 14.4 oz)   05/11/17 66.2 kg (146 lb)              We Performed the Following     NEUROMUSCULAR RE-EDUCATION     THERAPEUTIC ACTIVITIES        Primary Care Provider Office Phone # Fax #    Heriberto Vinny Zavala [phone]       [facility] [address]  [facility] [address]        Equal Access to Services     [facility]: Hadii aad ku hadasho Soomaali, waaxda luqadaha, qaybta kaalmada adeegyada, waxay tramainein hayaan axel [place] . So [facility] [phone].    ATENCIÓN: Si habla español, tiene a michel disposición servicios gratuitos de asistencia lingüística. [facility] [phone].    We comply with applicable federal civil rights laws and [place] laws. We do not discriminate on the basis of race, color, national origin, age, disability, sex, sexual orientation, or gender identity.            Thank you!     Thank you for choosing [facility] [place]  for your care. Our goal is always to provide you with excellent care. Hearing back from our patients is one way we can continue to improve our services. Please take a few minutes to complete the written survey that you may receive in the mail after your visit with us. Thank you!             Your Updated Medication List - Protect others around you: Learn how to safely use, store and throw away your medicines at www.disposemymeds.org.          This list is accurate as of: 10/30/17  3:03 PM.  Always use your most recent med list.                   Brand Name Dispense Instructions for use Diagnosis    oxyCODONE 5 MG IR tablet    ROXICODONE    20 tablet    Take 1-2 tablets (5-10 mg) by mouth every 3 hours as needed for pain or other (Moderate to Severe)    Popliteal  artery entrapment syndrome (H)       PAMPRIN ALL DAY RELIEF MAX ST PO      Take 2 tablets by mouth 2 times daily as needed for moderate pain

## 2017-10-30 NOTE — PROGRESS NOTES
Objective:  Normal hip ROM B  Negative hip impingement findings       Running Video Gait Analysis  Date: 10/30/2017   Injury: B compartment syndrome  Speed(MPH): 5.6 Pace(min/mile): na    Shoe: Mizuno Stability  Orthotics: NA  Shoe Type: Stability  Current Domingo: 156  Pain:  Recommended Domingo  170 progressing to 175    Posterior/Back View:    Pronation    Left: Uncontrolled.  Right:Controlled.  Toe-Out   Left: Normal. Right: Normal.  Push Off   Left: Normal.  Right: Normal.  Step Width   Left: Normal. Right: Normal.  Heel Whip   Left: None. Right: None.  Shoulder/arm   Left: Abducted. Right: Normal.  Pelvis   Left: Level.  Right: Trendelenburg.    Trunk Rotation: Normal. Vertical Displacement: Minimal.    Lateral/Side View:    Strike Pattern     Left: Heel. Right: Heel.    Knee Flexion     Left: Normal. Right: Normal.    Hip Extension     Left: Normal. Right: Normal.    Tibial Inclincation     Left: Excessive. Right: Excessive.    Trunk: Normal.       Anterior/Front View:    Hip     Left: Neutral.  Right: Neutral.    Knee     Left: Neutral. Right: Neutral.      Overall Impression/Summary: Altagracia presents with B compartment syndrome.  Running evaluation suggests increase in tibial inclination angle and foot strike angle that could be increasing loading forces through her lower leg.  We are going to trial increasing her domingo to correct for the above findings.  Altagracia will work for 2-3 weeks with an interval training program with the goal of running at 175 steps/min.

## 2017-11-20 ENCOUNTER — THERAPY VISIT (OUTPATIENT)
Dept: PHYSICAL THERAPY | Facility: CLINIC | Age: 22
End: 2017-11-20
Payer: OTHER GOVERNMENT

## 2017-11-20 DIAGNOSIS — M79.A29 NON-TRAUMATIC COMPARTMENT SYNDROME OF LOWER EXTREMITY, UNSPECIFIED LATERALITY: ICD-10-CM

## 2017-11-20 PROCEDURE — 97110 THERAPEUTIC EXERCISES: CPT | Mod: GP | Performed by: PHYSICAL THERAPIST

## 2017-11-20 PROCEDURE — 97530 THERAPEUTIC ACTIVITIES: CPT | Mod: GP | Performed by: PHYSICAL THERAPIST

## 2017-11-20 NOTE — PROGRESS NOTES
Therapist Impression:   No change in 20 deg of DF at heel strike at 156 vs 172 vs 176 domingo.  Pt reports only practicing striking with flatter foot vs domingo manipulation.    NEXT: Review of status and running assessment    Hip and Knee Strength   MMT Hip Abduction Hip Extension Hip ER Knee Flexion   Left 5-/5 4/5 na/5 na/5   Right 4+/5 5-/5 na/5 na/5

## 2017-11-20 NOTE — MR AVS SNAPSHOT
After Visit Summary   11/20/2017    Altagracia Wright    MRN: [id number]           Patient Information     Date Of Birth          1995        Visit Information        Provider Department      11/20/2017 1:40 PM Timothy Deal PT [facility]        Today's Diagnoses     Non-traumatic compartment syndrome of lower extremity, unspecified laterality           Follow-ups after your visit        Your next 10 appointments already scheduled     Dec 18, 2017  7:40 AM CST   MONICA Extremity with Timothy Deal PT   [facility] ( [facility])    [address] [place] [address] [id number] [phone]              Who to contact     If you have questions or need follow up information about today's clinic visit or your schedule please contact [facility] directly at [phone].  Normal or non-critical lab and imaging results will be communicated to you by [employer], letter or phone within 4 business days after the clinic has received the results. If you do not hear from us within 7 days, please contact the clinic through [employer] or phone. If you have a critical or abnormal lab result, we will notify you by phone as soon as possible.  Submit refill requests through [employer] or call your pharmacy and they will forward the refill request to us. Please allow 3 business days for your refill to be completed.          Additional Information About Your Visit        MyChart Information     [employer] gives you secure access to your electronic health record. If you see a primary care provider, you can also send messages to your care team and make appointments. If you have questions, please call your primary care clinic.  If you do not have a primary care provider, please call [phone] and they will assist you.        Care EveryWhere ID     This is your Care EveryWhere ID. This could be used  by other organizations to access your [place] medical records  [id number]         Blood Pressure from Last 3 Encounters:   10/05/17 120/78   06/15/17 104/62   05/31/17 111/68    Weight from Last 3 Encounters:   05/31/17 66.6 kg (146 lb 14.4 oz)   05/11/17 66.2 kg (146 lb)              We Performed the Following     THERAPEUTIC ACTIVITIES     THERAPEUTIC EXERCISES        Primary Care Provider Office Phone # Fax #    Heriberto Vinny Zavala [phone]       [facility] [address]  [facility] [address]        Equal Access to Services     [facility]: Hadii aad ku hadasho Soomaali, waaxda luqadaha, qaybta kaalmada adeegyada, waxay idiin hayaan adeeg laura irving . So [facility] [phone].    ATENCIÓN: Si habla español, tiene a michel disposición servicios gratuitos de asistencia lingüística. Llame al [phone].    We comply with applicable federal civil rights laws and [place] laws. We do not discriminate on the basis of race, color, national origin, age, disability, sex, sexual orientation, or gender identity.            Thank you!     Thank you for choosing [facility] [place]  for your care. Our goal is always to provide you with excellent care. Hearing back from our patients is one way we can continue to improve our services. Please take a few minutes to complete the written survey that you may receive in the mail after your visit with us. Thank you!             Your Updated Medication List - Protect others around you: Learn how to safely use, store and throw away your medicines at www.disposemymeds.org.          This list is accurate as of: 11/20/17  3:28 PM.  Always use your most recent med list.                   Brand Name Dispense Instructions for use Diagnosis    oxyCODONE IR 5 MG tablet    ROXICODONE    20 tablet    Take 1-2 tablets (5-10 mg) by mouth every 3 hours as needed for pain or other (Moderate to Severe)    Popliteal artery  entrapment syndrome (H)       PAMPRIN ALL DAY RELIEF MAX ST PO      Take 2 tablets by mouth 2 times daily as needed for moderate pain

## 2017-12-18 ENCOUNTER — THERAPY VISIT (OUTPATIENT)
Dept: PHYSICAL THERAPY | Facility: CLINIC | Age: 22
End: 2017-12-18
Payer: OTHER GOVERNMENT

## 2017-12-18 DIAGNOSIS — M79.A29 NON-TRAUMATIC COMPARTMENT SYNDROME OF LOWER EXTREMITY, UNSPECIFIED LATERALITY: ICD-10-CM

## 2017-12-18 PROCEDURE — 97530 THERAPEUTIC ACTIVITIES: CPT | Mod: GP | Performed by: PHYSICAL THERAPIST

## 2017-12-18 PROCEDURE — 97112 NEUROMUSCULAR REEDUCATION: CPT | Mod: GP | Performed by: PHYSICAL THERAPIST

## 2017-12-18 PROCEDURE — 97110 THERAPEUTIC EXERCISES: CPT | Mod: GP | Performed by: PHYSICAL THERAPIST

## 2017-12-18 NOTE — MR AVS SNAPSHOT
After Visit Summary   12/18/2017    Altagracia Wright    MRN: [id number]           Patient Information     Date Of Birth          1995        Visit Information        Provider Department      12/18/2017 7:40 AM Timothy Deal PT [facility]        Today's Diagnoses     Non-traumatic compartment syndrome of lower extremity, unspecified laterality           Follow-ups after your visit        Your next 10 appointments already scheduled     Feb 05, 2018  9:40 AM CST   MONICA Extremity with Timothy Deal PT   [facility] ( [facility])    [address] [place] [address] [phone]              Who to contact     If you have questions or need follow up information about today's clinic visit or your schedule please contact [facility] directly at [phone].  Normal or non-critical lab and imaging results will be communicated to you by [employer], letter or phone within 4 business days after the clinic has received the results. If you do not hear from us within 7 days, please contact the clinic through [employer] or phone. If you have a critical or abnormal lab result, we will notify you by phone as soon as possible.  Submit refill requests through [employer] or call your pharmacy and they will forward the refill request to us. Please allow 3 business days for your refill to be completed.          Additional Information About Your Visit        MyChart Information     [employer] gives you secure access to your electronic health record. If you see a primary care provider, you can also send messages to your care team and make appointments. If you have questions, please call your primary care clinic.  If you do not have a primary care provider, please call [phone] and they will assist you.        Care EveryWhere ID     This is your Care EveryWhere ID. This could be used  by other organizations to access your [place] medical records  [id number]         Blood Pressure from Last 3 Encounters:   10/05/17 120/78   06/15/17 104/62   05/31/17 111/68    Weight from Last 3 Encounters:   05/31/17 66.6 kg (146 lb 14.4 oz)   05/11/17 66.2 kg (146 lb)              We Performed the Following     NEUROMUSCULAR RE-EDUCATION     THERAPEUTIC ACTIVITIES     THERAPEUTIC EXERCISES        Primary Care Provider Office Phone # Fax #    Heriberto Vinny Zavala [phone]       [facility] [address] [facility] [address]  [facility] [address]        Equal Access to Services     [facility] : Hadii aad ku hadasho Soomaali, waaxda luqadaha, qaybta kaalmada adeegyada, waxay idiin hayaan adeeg laura irving . So [facility] [phone].    ATENCIÓN: Si habla español, tiene a michel disposición servicios gratuitos de asistencia lingüística. [facility] [phone].    We comply with applicable federal civil rights laws and [place] laws. We do not discriminate on the basis of race, color, national origin, age, disability, sex, sexual orientation, or gender identity.            Thank you!     Thank you for choosing [facility] [place]  for your care. Our goal is always to provide you with excellent care. Hearing back from our patients is one way we can continue to improve our services. Please take a few minutes to complete the written survey that you may receive in the mail after your visit with us. Thank you!             Your Updated Medication List - Protect others around you: Learn how to safely use, store and throw away your medicines at www.disposemymeds.org.          This list is accurate as of: 12/18/17 11:46 AM.  Always use your most recent med list.                   Brand Name Dispense Instructions for use Diagnosis    oxyCODONE IR 5 MG tablet    ROXICODONE    20 tablet    Take 1-2 tablets (5-10 mg) by mouth every 3 hours as needed for pain or other (Moderate to  Severe)    Popliteal artery entrapment syndrome (H)       PAMPRIN ALL DAY RELIEF MAX ST PO      Take 2 tablets by mouth 2 times daily as needed for moderate pain

## 2017-12-18 NOTE — PROGRESS NOTES
Therapist Impression:       NEXT: Review of status and running assessment    Hip and Knee Strength   MMT Hip Abduction Hip Extension Hip ER Knee Flexion   Left 5-/5 5/5 na/5 na/5   Right 5-/5 5/5 na/5 na/5

## 2018-02-08 ENCOUNTER — THERAPY VISIT (OUTPATIENT)
Dept: PHYSICAL THERAPY | Facility: CLINIC | Age: 23
End: 2018-02-08
Payer: OTHER GOVERNMENT

## 2018-02-08 DIAGNOSIS — M79.A29 NON-TRAUMATIC COMPARTMENT SYNDROME OF LOWER EXTREMITY, UNSPECIFIED LATERALITY: ICD-10-CM

## 2018-02-08 PROCEDURE — 97530 THERAPEUTIC ACTIVITIES: CPT | Mod: GP | Performed by: PHYSICAL THERAPIST

## 2018-02-08 NOTE — PROGRESS NOTES
Letter to  Medical Board      February 8, 2018      To whom it may concern:    My name is Timothy Say and I am the physical therapist that is working with Altagracia Wright over the past three and a half months.  As you are aware she has been having compartment syndrome issues for about one year.  After reexamination of Altagracia today, she demonstrates proper range of motion, strength, and movement patterns that would not suggest any restrictions from field training or any sort of physical training.  The only thing she needs to be aware of is utilizing a slow, gradual progression with her return to running program while continuing to be respectful of her symptoms.  Other than this, she does not have any physical restrictions.    Please feel free to contact me with questions.      Respectfully,        Timothy Deal, PT, DPT, SCS, CSCS  [phone]  [email or web address]

## 2018-02-08 NOTE — PROGRESS NOTES
PROGRESS REPORT    Altagracia has been in therapy from October 25, 2017 to Feb 8, 2018 for treatment of B compartment syndrome .    Therapist Impression:  See above for note to  for PT impression    Subjective:  Subjective: Ran 2 miles at 15 min and did well.  A little sore today.  Ran today 50 min at a 12-15 min pace.  Rest and holding off of heavy lifting and not taking any supplements may be playing a factor.    Objective:  Dynamic Movement Screen  Single leg stance observations: Not assessed  Double limb squat observations: Good technique/no significant findings  Single limb squat observations: Good technique/no significant findings    Ankle Range of Motion  Ankle AROM Dorsiflexion Plantarflexion Inversion Eversion WBing DF (cm)   Left wnl wnl wnl wnl wnl   Right wnl wnl wnl wnl wnl   **WBing DF- distance between wall and great toe where pt can touch knee to wall and keep heel in contact with floor    Ankle Strength  Ankle MMT Dorsiflexion Plantarflexion Inversion Eversion   Left 5/5 na/5 5/5 5/5   Right 5/5 na/5 5/5 5/5     Forefoot varus and hypermobile midfoot    Palpation  B TTP along peroneal, posterior tibialis, and proximal medial gastroc L>R    ASSESSMENT/PLAN  Updated problem list and treatment plan: The encounter diagnosis was Non-traumatic compartment syndrome of lower extremity, unspecified laterality. Pain - HEP  Decreased ROM/flexibility - HEP  Decreased function - HEP  Decreased strength - HEP  Impaired muscle performance - HEP  STG/LTGs have been met or progress has been made towards goals:  Yes (See Goal flow sheet completed today.)  Assessment of Progress: The patient's condition is improving.  Self Management Plans:  Patient has been instructed in a home treatment program.  Patient  has been instructed in self management of symptoms.  I have re-evaluated this patient and find that the nature, scope, duration and intensity of the therapy is appropriate for the medical condition of the  patientAdan Frias continues to require the following intervention to meet STG and LTG's:  PT intervention is no longer required to meet STG/LTG.    Recommendations:  This patient is ready to be discharged from therapy and continue their home treatment program.          Please refer to the daily flowsheet for treatment today, total treatment time and time spent performing 1:1 timed codes.

## 2018-02-08 NOTE — MR AVS SNAPSHOT
After Visit Summary   2/8/2018    Altagracia Wright    MRN: [id number]           Patient Information     Date Of Birth          1995        Visit Information        Provider Department      2/8/2018 9:00 AM Timothy Deal PT [facility]        Today's Diagnoses     Non-traumatic compartment syndrome of lower extremity, unspecified laterality           Follow-ups after your visit        Who to contact     If you have questions or need follow up information about today's clinic visit or your schedule please contact [facility] directly at [phone].  Normal or non-critical lab and imaging results will be communicated to you by [employer], letter or phone within 4 business days after the clinic has received the results. If you do not hear from us within 7 days, please contact the clinic through [employer] or phone. If you have a critical or abnormal lab result, we will notify you by phone as soon as possible.  Submit refill requests through [employer] or call your pharmacy and they will forward the refill request to us. Please allow 3 business days for your refill to be completed.          Additional Information About Your Visit        MyChart Information     [employer] gives you secure access to your electronic health record. If you see a primary care provider, you can also send messages to your care team and make appointments. If you have questions, please call your primary care clinic.  If you do not have a primary care provider, please call [phone] and they will assist you.        Care EveryWhere ID     This is your Care EveryWhere ID. This could be used by other organizations to access your [place] medical records  [id number]         Blood Pressure from Last 3 Encounters:   10/05/17 120/78   06/15/17 104/62   05/31/17 111/68    Weight from Last 3 Encounters:   05/31/17 66.6 kg (146 lb 14.4 oz)   05/11/17 66.2 kg (146 lb)               We Performed the Following     THERAPEUTIC ACTIVITIES        Primary Care Provider Office Phone # Fax #    Heriberto Zavala [phone]       [facility] [address]  [facility] [address]        Equal Access to Services     Y[facility] LAN[facility] : Hadii aad ku hadinnao Soomaali, waaxda luqadaha, qaybta kaalmada adeegyada, waxemeterio idiin haycoryn adeann-marie sanchez laeladioiveth . So [facility] [phone].    ATENCIÓN: Si habla español, tiene a michel disposición servicios gratuitos de asistencia lingüística. Llame al [phone].    We comply with applicable federal civil rights laws and [place] laws. We do not discriminate on the basis of race, color, national origin, age, disability, sex, sexual orientation, or gender identity.            Thank you!     Thank you for choosing [facility] [place]  for your care. Our goal is always to provide you with excellent care. Hearing back from our patients is one way we can continue to improve our services. Please take a few minutes to complete the written survey that you may receive in the mail after your visit with us. Thank you!             Your Updated Medication List - Protect others around you: Learn how to safely use, store and throw away your medicines at www.disposemymeds.org.          This list is accurate as of 2/8/18 12:52 PM.  Always use your most recent med list.                   Brand Name Dispense Instructions for use Diagnosis    oxyCODONE IR 5 MG tablet    ROXICODONE    20 tablet    Take 1-2 tablets (5-10 mg) by mouth every 3 hours as needed for pain or other (Moderate to Severe)    Popliteal artery entrapment syndrome (H)       PAMPRIN ALL DAY RELIEF MAX ST PO      Take 2 tablets by mouth 2 times daily as needed for moderate pain

## 2018-06-17 ENCOUNTER — HEALTH MAINTENANCE LETTER (OUTPATIENT)
Age: 23
End: 2018-06-17

## 2019-11-05 ENCOUNTER — HEALTH MAINTENANCE LETTER (OUTPATIENT)
Age: 24
End: 2019-11-05

## 2020-11-22 ENCOUNTER — HEALTH MAINTENANCE LETTER (OUTPATIENT)
Age: 25
End: 2020-11-22

## 2021-09-19 ENCOUNTER — HEALTH MAINTENANCE LETTER (OUTPATIENT)
Age: 26
End: 2021-09-19

## 2022-01-08 ENCOUNTER — HEALTH MAINTENANCE LETTER (OUTPATIENT)
Age: 27
End: 2022-01-08

## 2022-11-20 ENCOUNTER — HEALTH MAINTENANCE LETTER (OUTPATIENT)
Age: 27
End: 2022-11-20

## 2023-04-15 ENCOUNTER — HEALTH MAINTENANCE LETTER (OUTPATIENT)
Age: 28
End: 2023-04-15

## (undated) DEVICE — COVER SHOE STERILE

## (undated) DEVICE — DRAPE EXTREMITY BILAT

## (undated) DEVICE — SU SILK 4-0 TIE 12X30" A303H

## (undated) DEVICE — ESU GROUND PAD UNIVERSAL W/O CORD

## (undated) DEVICE — DRSG GAUZE 4X4" 3033

## (undated) DEVICE — BLADE KNIFE SURG 10 371110

## (undated) DEVICE — GOWN IMPERVIOUS ZONED LG

## (undated) DEVICE — LINEN TOWEL PACK X5 5464

## (undated) DEVICE — DRSG STERI STRIP 1/2X4" R1547

## (undated) DEVICE — SUCTION CANISTER MEDIVAC LINER 3000ML W/LID 65651-530

## (undated) DEVICE — BNDG KLING 4" 2236

## (undated) DEVICE — SU MONOCRYL 4-0 PS-2 18" UND Y496G

## (undated) DEVICE — SU SILK 3-0 TIE 24" SA74H

## (undated) DEVICE — SOL NACL 0.9% IRRIG 1000ML BOTTLE 07138-09

## (undated) DEVICE — PREP CHLORAPREP 26ML TINTED ORANGE  260815

## (undated) DEVICE — SYR BULB IRRIG 50ML LATEX FREE 0035280

## (undated) DEVICE — SU VICRYL 3-0 SH 27" J316H

## (undated) DEVICE — GLOVE PROTEXIS W/NEU-THERA 7.5  2D73TE75

## (undated) DEVICE — CLIP ETHICON LIGACLIP SM BLUE LT100

## (undated) DEVICE — LINEN LEG ROLL 5489

## (undated) DEVICE — PACK MINOR SBA15MIFSE

## (undated) RX ORDER — CEFAZOLIN SODIUM 2 G/100ML
INJECTION, SOLUTION INTRAVENOUS
Status: DISPENSED
Start: 2017-05-31

## (undated) RX ORDER — FENTANYL CITRATE 50 UG/ML
INJECTION, SOLUTION INTRAMUSCULAR; INTRAVENOUS
Status: DISPENSED
Start: 2017-05-31

## (undated) RX ORDER — PROPOFOL 10 MG/ML
INJECTION, EMULSION INTRAVENOUS
Status: DISPENSED
Start: 2017-05-31

## (undated) RX ORDER — OXYCODONE HYDROCHLORIDE 5 MG/1
TABLET ORAL
Status: DISPENSED
Start: 2017-05-31

## (undated) RX ORDER — BUPIVACAINE HYDROCHLORIDE 5 MG/ML
INJECTION, SOLUTION EPIDURAL; INTRACAUDAL
Status: DISPENSED
Start: 2017-05-31

## (undated) RX ORDER — KETOROLAC TROMETHAMINE 30 MG/ML
INJECTION, SOLUTION INTRAMUSCULAR; INTRAVENOUS
Status: DISPENSED
Start: 2017-05-31

## (undated) RX ORDER — GLYCOPYRROLATE 0.2 MG/ML
INJECTION, SOLUTION INTRAMUSCULAR; INTRAVENOUS
Status: DISPENSED
Start: 2017-05-31

## (undated) RX ORDER — DEXAMETHASONE SODIUM PHOSPHATE 4 MG/ML
INJECTION, SOLUTION INTRA-ARTICULAR; INTRALESIONAL; INTRAMUSCULAR; INTRAVENOUS; SOFT TISSUE
Status: DISPENSED
Start: 2017-05-31

## (undated) RX ORDER — CEFAZOLIN SODIUM 1 G/3ML
INJECTION, POWDER, FOR SOLUTION INTRAMUSCULAR; INTRAVENOUS
Status: DISPENSED
Start: 2017-05-31